# Patient Record
Sex: FEMALE | Race: ASIAN | NOT HISPANIC OR LATINO | Employment: FULL TIME | URBAN - METROPOLITAN AREA
[De-identification: names, ages, dates, MRNs, and addresses within clinical notes are randomized per-mention and may not be internally consistent; named-entity substitution may affect disease eponyms.]

---

## 2023-11-10 ENCOUNTER — OFFICE VISIT (OUTPATIENT)
Dept: FAMILY MEDICINE | Facility: CLINIC | Age: 23
End: 2023-11-10
Payer: COMMERCIAL

## 2023-11-10 VITALS
BODY MASS INDEX: 20.2 KG/M2 | HEIGHT: 61 IN | WEIGHT: 107 LBS | TEMPERATURE: 97.6 F | SYSTOLIC BLOOD PRESSURE: 114 MMHG | RESPIRATION RATE: 11 BRPM | OXYGEN SATURATION: 100 % | DIASTOLIC BLOOD PRESSURE: 64 MMHG | HEART RATE: 96 BPM

## 2023-11-10 DIAGNOSIS — Z11.59 NEED FOR HEPATITIS C SCREENING TEST: ICD-10-CM

## 2023-11-10 DIAGNOSIS — Z00.00 ROUTINE GENERAL MEDICAL EXAMINATION AT A HEALTH CARE FACILITY: Primary | ICD-10-CM

## 2023-11-10 DIAGNOSIS — E04.9 ENLARGED THYROID: ICD-10-CM

## 2023-11-10 DIAGNOSIS — M25.562 CHRONIC PAIN OF LEFT KNEE: ICD-10-CM

## 2023-11-10 DIAGNOSIS — Z83.79 FAMILY HISTORY OF LIVER DISEASE: ICD-10-CM

## 2023-11-10 DIAGNOSIS — Z11.3 SCREEN FOR STD (SEXUALLY TRANSMITTED DISEASE): ICD-10-CM

## 2023-11-10 DIAGNOSIS — Z11.4 SCREENING FOR HIV (HUMAN IMMUNODEFICIENCY VIRUS): ICD-10-CM

## 2023-11-10 DIAGNOSIS — Z13.29 SCREENING FOR THYROID DISORDER: ICD-10-CM

## 2023-11-10 DIAGNOSIS — Z76.89 ENCOUNTER TO ESTABLISH CARE: ICD-10-CM

## 2023-11-10 DIAGNOSIS — E61.1 IRON DEFICIENCY: ICD-10-CM

## 2023-11-10 DIAGNOSIS — Z13.220 SCREENING FOR HYPERLIPIDEMIA: ICD-10-CM

## 2023-11-10 DIAGNOSIS — Z13.0 SCREENING FOR DEFICIENCY ANEMIA: ICD-10-CM

## 2023-11-10 DIAGNOSIS — G89.29 CHRONIC PAIN OF LEFT KNEE: ICD-10-CM

## 2023-11-10 DIAGNOSIS — D64.9 ANEMIA, UNSPECIFIED TYPE: ICD-10-CM

## 2023-11-10 DIAGNOSIS — Z83.3 FAMILY HISTORY OF DIABETES MELLITUS: ICD-10-CM

## 2023-11-10 LAB
ALBUMIN SERPL BCG-MCNC: 4.7 G/DL (ref 3.5–5.2)
ALP SERPL-CCNC: 51 U/L (ref 35–104)
ALT SERPL W P-5'-P-CCNC: 22 U/L (ref 0–50)
ANION GAP SERPL CALCULATED.3IONS-SCNC: 12 MMOL/L (ref 7–15)
AST SERPL W P-5'-P-CCNC: 19 U/L (ref 0–45)
BILIRUB SERPL-MCNC: 0.5 MG/DL
BUN SERPL-MCNC: 9.2 MG/DL (ref 6–20)
CALCIUM SERPL-MCNC: 9.4 MG/DL (ref 8.6–10)
CHLORIDE SERPL-SCNC: 106 MMOL/L (ref 98–107)
CHOLEST SERPL-MCNC: 198 MG/DL
CREAT SERPL-MCNC: 0.53 MG/DL (ref 0.51–0.95)
DEPRECATED HCO3 PLAS-SCNC: 24 MMOL/L (ref 22–29)
EGFRCR SERPLBLD CKD-EPI 2021: >90 ML/MIN/1.73M2
ERYTHROCYTE [DISTWIDTH] IN BLOOD BY AUTOMATED COUNT: 14.9 % (ref 10–15)
GLUCOSE SERPL-MCNC: 82 MG/DL (ref 70–99)
HCT VFR BLD AUTO: 33.4 % (ref 35–47)
HDLC SERPL-MCNC: 81 MG/DL
HGB BLD-MCNC: 10.8 G/DL (ref 11.7–15.7)
LDLC SERPL CALC-MCNC: 107 MG/DL
MCH RBC QN AUTO: 19.9 PG (ref 26.5–33)
MCHC RBC AUTO-ENTMCNC: 32.3 G/DL (ref 31.5–36.5)
MCV RBC AUTO: 62 FL (ref 78–100)
NONHDLC SERPL-MCNC: 117 MG/DL
PLATELET # BLD AUTO: 253 10E3/UL (ref 150–450)
POTASSIUM SERPL-SCNC: 3.7 MMOL/L (ref 3.4–5.3)
PROT SERPL-MCNC: 7.8 G/DL (ref 6.4–8.3)
RBC # BLD AUTO: 5.43 10E6/UL (ref 3.8–5.2)
SODIUM SERPL-SCNC: 142 MMOL/L (ref 135–145)
TRIGL SERPL-MCNC: 48 MG/DL
TSH SERPL DL<=0.005 MIU/L-ACNC: 2.97 UIU/ML (ref 0.3–4.2)
WBC # BLD AUTO: 5.2 10E3/UL (ref 4–11)

## 2023-11-10 PROCEDURE — 80053 COMPREHEN METABOLIC PANEL: CPT | Performed by: NURSE PRACTITIONER

## 2023-11-10 PROCEDURE — 83540 ASSAY OF IRON: CPT | Performed by: NURSE PRACTITIONER

## 2023-11-10 PROCEDURE — 87591 N.GONORRHOEAE DNA AMP PROB: CPT | Performed by: NURSE PRACTITIONER

## 2023-11-10 PROCEDURE — 83550 IRON BINDING TEST: CPT | Performed by: NURSE PRACTITIONER

## 2023-11-10 PROCEDURE — 87389 HIV-1 AG W/HIV-1&-2 AB AG IA: CPT | Performed by: NURSE PRACTITIONER

## 2023-11-10 PROCEDURE — 86803 HEPATITIS C AB TEST: CPT | Performed by: NURSE PRACTITIONER

## 2023-11-10 PROCEDURE — 82728 ASSAY OF FERRITIN: CPT | Performed by: NURSE PRACTITIONER

## 2023-11-10 PROCEDURE — 85027 COMPLETE CBC AUTOMATED: CPT | Performed by: NURSE PRACTITIONER

## 2023-11-10 PROCEDURE — 87491 CHLMYD TRACH DNA AMP PROBE: CPT | Performed by: NURSE PRACTITIONER

## 2023-11-10 PROCEDURE — 36415 COLL VENOUS BLD VENIPUNCTURE: CPT | Performed by: NURSE PRACTITIONER

## 2023-11-10 PROCEDURE — 80061 LIPID PANEL: CPT | Performed by: NURSE PRACTITIONER

## 2023-11-10 PROCEDURE — 99385 PREV VISIT NEW AGE 18-39: CPT | Performed by: NURSE PRACTITIONER

## 2023-11-10 PROCEDURE — 84443 ASSAY THYROID STIM HORMONE: CPT | Performed by: NURSE PRACTITIONER

## 2023-11-10 ASSESSMENT — ENCOUNTER SYMPTOMS
FREQUENCY: 0
NERVOUS/ANXIOUS: 0
NAUSEA: 0
EYE PAIN: 0
HEMATURIA: 0
COUGH: 0
DIARRHEA: 0
HEADACHES: 0
HEMATOCHEZIA: 0
JOINT SWELLING: 0
DIZZINESS: 0
DYSURIA: 0
PARESTHESIAS: 0
WEAKNESS: 0
ARTHRALGIAS: 1
SHORTNESS OF BREATH: 0
PALPITATIONS: 0
CHILLS: 0
MYALGIAS: 0
ABDOMINAL PAIN: 0
SORE THROAT: 0
CONSTIPATION: 0
FEVER: 0
BREAST MASS: 0
HEARTBURN: 0

## 2023-11-10 NOTE — PROGRESS NOTES
SUBJECTIVE:   CC: Clifford is an 23 year old who presents for preventive health visit.       11/10/2023    10:58 AM   Additional Questions   Roomed by Elliot FOSTER   Accompanied by Self    - Cambodian -- Demi    Moved from [facility] 2 years ago - never seen at any other clinics.     Healthy Habits:     Getting at least 3 servings of Calcium per day:  Yes    Bi-annual eye exam:  NO    Dental care twice a year:  NO    Sleep apnea or symptoms of sleep apnea:  None    Diet:  Regular (no restrictions) and Breakfast skipped    Frequency of exercise:  None    Taking medications regularly:  Yes    Medication side effects:  None    Additional concerns today:  Yes    X2 years -- knees hurt -- can be from sitting too long.   Seems to happen more when the weather is cold outside  Burning sensation   No injuries to knees.     Sexually active. Uses Condom.   Normal menses     Heavy flow today offered pap but wants to come back next week.      Today's PHQ-2 Score:       11/10/2023    10:46 AM   PHQ-2 ( 1999 Pfizer)   Q1: Little interest or pleasure in doing things 0   Q2: Feeling down, depressed or hopeless 0   PHQ-2 Score 0   Q1: Little interest or pleasure in doing things Not at all   Q2: Feeling down, depressed or hopeless Not at all   PHQ-2 Score 0     Have you ever done Advance Care Planning? (For example, a Health Directive, POLST, or a discussion with a medical provider or your loved ones about your wishes): No, advance care planning information given to patient to review.  Patient plans to discuss their wishes with loved ones or provider.      Social History     Tobacco Use     Smoking status: Never     Smokeless tobacco: Never   Substance Use Topics     Alcohol use: Not on file         11/10/2023    10:46 AM   Alcohol Use   Prescreen: >3 drinks/day or >7 drinks/week? No     Reviewed orders with patient.  Reviewed health maintenance and updated orders accordingly - Yes  Lab work is in process    Breast Cancer  "Screening: NA      History of abnormal Pap smear: NO - age 21-29 PAP every 3 years recommended      11/17/2023     8:47 AM   PAP / HPV   PAP Negative for Intraepithelial Lesion or Malignancy (NILM)      Reviewed and updated as needed this visit by clinical staff   Tobacco  Allergies  Meds  Problems  Med Hx  Surg Hx  Fam Hx          Reviewed and updated as needed this visit by Provider   Tobacco  Allergies  Meds  Problems  Med Hx  Surg Hx  Fam Hx           Review of Systems   Constitutional:  Negative for chills and fever.   HENT:  Negative for congestion, ear pain, hearing loss and sore throat.    Eyes:  Negative for pain and visual disturbance.   Respiratory:  Negative for cough and shortness of breath.    Cardiovascular:  Negative for chest pain, palpitations and peripheral edema.   Gastrointestinal:  Negative for abdominal pain, constipation, diarrhea, heartburn, hematochezia and nausea.   Breasts:  Negative for tenderness, breast mass and discharge.   Genitourinary:  Negative for dysuria, frequency, genital sores, hematuria, pelvic pain, urgency, vaginal bleeding and vaginal discharge.   Musculoskeletal:  Positive for arthralgias. Negative for joint swelling and myalgias.   Skin:  Negative for rash.   Neurological:  Negative for dizziness, weakness, headaches and paresthesias.   Psychiatric/Behavioral:  Negative for mood changes. The patient is not nervous/anxious.        OBJECTIVE:   /64 (BP Location: Right arm, Patient Position: Chair, Cuff Size: Adult Regular)   Pulse 96   Temp 97.6  F (36.4  C) (Tympanic)   Resp 11   Ht 1.539 m (5' 0.6\")   Wt 48.5 kg (107 lb)   LMP 11/09/2023 (Exact Date)   SpO2 100%   BMI 20.49 kg/m    Physical Exam  GENERAL: healthy, alert and no distress  EYES: Eyes grossly normal to inspection, PERRL and conjunctivae and sclerae normal  HENT: ear canals and TM's normal, nose and mouth without ulcers or lesions  NECK: no adenopathy, no asymmetry, masses, or " scars and thyroid normal to palpation  RESP: lungs clear to auscultation - no rales, rhonchi or wheezes  CV: regular rate and rhythm, normal S1 S2, no S3 or S4, no murmur, click or rub, no peripheral edema and peripheral pulses strong  ABDOMEN: soft, nontender, no hepatosplenomegaly, no masses and bowel sounds normal  MS: no gross musculoskeletal defects noted, no edema  SKIN: no suspicious lesions or rashes  NEURO: Normal strength and tone, mentation intact and speech normal  PSYCH: mentation appears normal, affect normal/bright    Diagnostic Test Results:  Labs reviewed in Epic    ASSESSMENT/PLAN:   Clifford was seen today for new patient and physical.    Diagnoses and all orders for this visit:    Routine general medical examination at a health care facility  Well woman exam  completed today.    Declines vaccines.  Will come back for breast exam and Pap smear  Labs today.    Will notify of lab results.  -     REVIEW OF HEALTH MAINTENANCE PROTOCOL ORDERS    Encounter to establish care    Chronic pain of left knee  Normal exam.   Xray offered and declined.   Continue to monitor.  At home cares and ortho prn.    Enlarged thyroid  -     US Thyroid; Future    Family history of liver disease  -     Hepatitis C Screen Reflex to HCV RNA Quant and Genotype; Future  -     Comprehensive metabolic panel (BMP + Alb, Alk Phos, ALT, AST, Total. Bili, TP); Future  -     Hepatitis C Screen Reflex to HCV RNA Quant and Genotype  -     Comprehensive metabolic panel (BMP + Alb, Alk Phos, ALT, AST, Total. Bili, TP)    Screening for thyroid disorder  -     TSH with free T4 reflex; Future  -     TSH with free T4 reflex    Screening for hyperlipidemia  -     Lipid panel reflex to direct LDL Fasting; Future  -     Lipid panel reflex to direct LDL Fasting    Family history of diabetes mellitus  -     Comprehensive metabolic panel (BMP + Alb, Alk Phos, ALT, AST, Total. Bili, TP); Future  -     Comprehensive metabolic panel (BMP + Alb, Alk Phos,  ALT, AST, Total. Bili, TP)    Screening for deficiency anemia  -     CBC with platelets; Future  -     CBC with platelets    Need for hepatitis C screening test  -     Hepatitis C Screen Reflex to HCV RNA Quant and Genotype; Future  -     Hepatitis C Screen Reflex to HCV RNA Quant and Genotype    Screening for HIV (human immunodeficiency virus)  -     HIV Antigen Antibody Combo; Future  -     HIV Antigen Antibody Combo    Anemia, unspecified type  -     Ferritin; Future  -     Iron and iron binding capacity; Future  -     Ferritin  -     Iron and iron binding capacity    Iron deficiency  -     CBC with platelets; Future  -     Ferritin; Future  -     Iron and iron binding capacity; Future    Screen for STD (sexually transmitted disease)  Other orders  -     NEISSERIA GONORRHOEA PCR; Future  -     CHLAMYDIA TRACHOMATIS PCR; Future  -     NEISSERIA GONORRHOEA PCR  -     CHLAMYDIA TRACHOMATIS PCR        Patient has been advised of split billing requirements and indicates understanding: Yes      COUNSELING:  Reviewed preventive health counseling, as reflected in patient instructions        She reports that she has never smoked. She has never used smokeless tobacco.                CHILANGO Aguila [facility] [place]

## 2023-11-10 NOTE — COMMUNITY RESOURCES LIST (ENGLISH)
11/10/2023   [facility] - Outpatient Clinics  N/A  For additional resource needs, please contact your health insurance member services or your primary care team.  Phone: [phone]   Email: N/A   Address: [facility] [place] [address]   Hours: N/A        Hotlines and Helplines       Hotline - Housing crisis  1  [facility] (Main Office) - Emergency Services Distance: 8.34 miles      Phone/Virtual   [address] [place] [address]  Language: English  Hours: Mon - Sun Open 24 Hours   Phone: (463) 904-2662 Email: info@Syniverse.TIMPIK Website: http://Syniverse.TIMPIK     2  [facility] Distance: 14.13 miles      Phone/Virtual   [address] [place] [address]  Language: English  Hours: Mon - Sun Open 24 Hours   Phone: (266) 613-9367 Email: info@Syniverse.TIMPIK Website: http://www.Syniverse.TIMPIK          Housing       Coordinated Entry access point  3  [facility] ([facility] - Housing Services Distance: 14.52 miles      In-Person   [address] [place] [address]  Language: English  Hours: Mon - Fri 9:00 AM - 5:00 PM  Fees: Free   Phone: (948) 508-3085 Email: housing@[facility].org Website: http://www.[facility].org/housing     4  [facility] Distance: 18.02 miles      In-Person, Phone/Virtual   [address] [place] [address]  Language: English, Lao  Hours: Mon - Fri 8:30 AM - 4:30 PM  Fees: Free   Phone: (519) 347-5909 Email: info@mncare.org Website: https://www.[facility].org/locations/downton-clinic/     Drop-in center or day shelter  5  [facility] Drop-In Resource Center Distance: 12.2 miles      In-Person   [address] [place] [address]  Language: English, Lao  Hours: Tue 2:00 PM - 5:00 PM , Thu 2:00 PM - 5:00 PM  Fees: Free   Phone: (837) 272-6735 Email: admin@launchministry.org Website: https://www.FunnelFire.org/     6  [employer] [place] - Drop-in  [place] Distance: 12.48 miles      In-Person   [address] [place] [address]  Language: English  Hours: Mon - Thu 2:30 PM - 5:00 PM  Fees: Free   Phone: (218) 599-5832 Email: info@moveLightning Gaming.org Website: http://[employer].org/     Housing search assistance  7  [employer] - Online housing search assistance Distance: 15.67 miles      Phone/Virtual   [address] [place] [address]  Language: English, Hmong, French, Citizen of the [place]  Hours: Mon - Sun Open 24 Hours   Phone: (429) 280-2096 Email: info@housinglink.org Website: http://www.Zooppa.org/     8  Affordable Housing Online - https://e-volo/ Distance: 15.76 miles      Phone/Virtual   [address] [place] [address]  Language: English  Hours: Mon - Sun Open 24 Hours   Email: info@LocaModa Website: https://e-volo     Shelter for individuals  9  [facility] ([facility]) [facility] Distance: 8.43 miles      In-Person   [address] [place] [address]  Language: English, Citizen of the [place]  Hours: Mon - Fri 8:00 AM - 4:30 PM  Fees: Free   Phone: (528) 174-8532 Email: info@[facility].org Website: https://www.capagency.org/     10  [facility] - Emergency Shelter Program Distance: 12.2 miles      In-Person   [address] [place] [address]  Language: English, Citizen of the [place]  Hours: Tue 2:00 PM - 5:00 PM , Thu 2:00 PM - 5:00 PM  Fees: Free   Phone: (336) 213-9775 Email: admin@OurCrowdministry.org Website: https://www.OurCrowdministry.org/     Shelter for youth  11  [facility] - Emergency Youth Shelter Distance: 13.39 miles      In-Person   [address] [place] [address]  Language: English  Hours: Mon - Sun Open 24 Hours  Fees: Free   Phone: (469) 220-1717 Email: info@cctwincities.org Website: https://www.cctwincities.org/locations/hope-street/          Important Numbers & Websites       [place] [address]  211unitedway.org  Poison Control   (791) 778-9335 [place]po[place]  Suicide and Crisis Lifeline   98 988[facility] Hospitalsline.org  Childhelp [place] Child Abuse Hotline   739.819.1534 Childhelphotline.org  [place] Sexual Assault Hotline   (392) 900-2912 (HOPE) [facility] Runaway Safeline   (555) 643-9679 (RUNAWAY) [facility].org  Pregnancy & Postpartum Support [place]   Call/text 621-132-1230 Ppsupportmn.org  Substance Abuse National Helpline ([facility]   597-232-HELP (5504) Findtreatment.gov  Emergency Services   919

## 2023-11-10 NOTE — COMMUNITY RESOURCES LIST (ENGLISH)
11/10/2023   [facility]  N/A  For questions about this resource list or additional care needs, please contact your primary care clinic or care manager.  Phone: [phone]   Email: N/A   Address: [facility] [place] [address]   Hours: N/A        Hotlines and Helplines       Hotline - Housing crisis  1  [facility] (Main Office) - Emergency Services Distance: 8.34 miles      Phone/Virtual   [address] [place] [address]  Language: English  Hours: Mon - Sun Open 24 Hours   Phone: (680) 869-9077 Email: info@Odyssey Thera.Layar Website: http://Odyssey Thera.Layar     2  [facility] Distance: 14.13 miles      Phone/Virtual   [address] [place] [address]  Language: English  Hours: Mon - Sun Open 24 Hours   Phone: (207) 649-8755 Email: info@Odyssey Thera.Layar Website: http://www.Odyssey Thera.org          Housing       Coordinated Entry access point  3  [facility] ([facility] - Housing Services Distance: 14.52 miles      In-Person   [address] [place] [address]  Language: English  Hours: Mon - Fri 9:00 AM - 5:00 PM  Fees: Free   Phone: (300) 242-7527 Email: housing@[facility].org Website: http://www.[facility].org/housing     4  [facility] [place] [facility] Distance: 18.02 miles      In-Person, Phone/Virtual   [address] [place] [address]  Language: English, Greenlandic  Hours: Mon - Fri 8:30 AM - 4:30 PM  Fees: Free   Phone: (590) 970-4767 Email: info@mncare.org Website: https://www.[facility].org/locations/downto-clinic/     Drop-in center or day shelter  5  [facility] Drop-In Resource Center Distance: 12.2 miles      In-Person   [address] [place] [address]  Language: English, Greenlandic  Hours: Tue 2:00 PM - 5:00 PM , Thu 2:00 PM - 5:00 PM  Fees: Free   Phone: (498) 865-3422 Email: admin@launchministry.org Website: https://www.Needl.org/     6  [facility] [place] - Drop-in  [place] Distance: 12.48 miles      In-Person   [address] [place] [address]  Language: English  Hours: Mon - Thu 2:30 PM - 5:00 PM  Fees: Free   Phone: (699) 471-5996 Email: info@[employer].Supersonic Website: http://[employer]aceuticals.Supersonic/     Housing search assistance  7  [facility] - Rental Homes for Future Homebuyers Program Distance: 5.31 miles      Phone/Virtual   [address] [place] [address]  Language: English  Hours: Mon - Fri 8:00 AM - 4:30 PM  Fees: Free   Phone: (599) 631-6743 Email: hra@[facility] Website: https://www.[facility]/hra/[place]-housing-and-ehsjcqomcvvbx-xjijjuhjb-wnx     8  [employer] - Housing Case Management Distance: 12.2 miles      In-Person, Phone/Virtual   [address] [place] [address]  Language: English, Latvian  Hours: Tue 2:00 PM - 5:00 PM , Thu 2:00 PM - 5:00 PM  Fees: Free   Phone: (845) 665-9821 Email: admin@[employer].Supersonic Website: https://www.Broadcast Pix.org/     Shelter for individuals  9  Community [employer] Partnership ([facility]) [facility] Distance: 8.43 miles      In-Person   [address] [place] [address]  Language: English, Latvian  Hours: Mon - Fri 8:00 AM - 4:30 PM  Fees: Free   Phone: (561) 670-9295 Email: info@[facility].org Website: https://www.On Demand TherapeuticsagenStepOne Health.org/     10  [employer] - Emergency Shelter Program Distance: 12.2 miles      In-Person   [address] [place] [address]  Language: English, Latvian  Hours: Tue 2:00 PM - 5:00 PM , Thu 2:00 PM - 5:00 PM  Fees: Free   Phone: (203) 847-4404 Email: admin@[employer].Supersonic Website: https://www.Broadcast Pix.org/     Shelter for youth  11  [facility] - Emergency Youth Shelter Distance: 13.39 miles      In-Person   [address] [place] [address]  Language: English  Hours: Mon - Sun Open 24 Hours  Fees: Free   Phone: (978) 771-4746 Email:  info@ActionBase.org Website: https://www.ActionBase.org/locations/hope-street/          Important Numbers & Websites       Emergency Services   911  [facility] Services   311  Poison Control   (771) 194-3349  Suicide Prevention Lifeline   (400) 979-4796 (TALK)  Child Abuse Hotline   (776) 245-7937 (4-A-Child)  Sexual Assault Hotline   (677) 227-8661 (HOPE)  National Runaway Safeline   (295) 613-2437 (RUNAWAY)  All-Options Talkline   (206) 505-6400  Substance Abuse Referral   (913) 662-2496 (HELP)

## 2023-11-11 LAB
C TRACH DNA SPEC QL NAA+PROBE: NEGATIVE
HCV AB SERPL QL IA: NONREACTIVE
HIV 1+2 AB+HIV1 P24 AG SERPL QL IA: NONREACTIVE
N GONORRHOEA DNA SPEC QL NAA+PROBE: NEGATIVE

## 2023-11-16 LAB
FERRITIN SERPL-MCNC: 83 NG/ML (ref 6–175)
IRON BINDING CAPACITY (ROCHE): 247 UG/DL (ref 240–430)
IRON SATN MFR SERPL: 28 % (ref 15–46)
IRON SERPL-MCNC: 70 UG/DL (ref 37–145)

## 2023-11-17 ENCOUNTER — OFFICE VISIT (OUTPATIENT)
Dept: FAMILY MEDICINE | Facility: CLINIC | Age: 23
End: 2023-11-17
Payer: COMMERCIAL

## 2023-11-17 VITALS
SYSTOLIC BLOOD PRESSURE: 97 MMHG | WEIGHT: 108 LBS | HEIGHT: 61 IN | TEMPERATURE: 97 F | RESPIRATION RATE: 12 BRPM | BODY MASS INDEX: 20.39 KG/M2 | OXYGEN SATURATION: 98 % | HEART RATE: 87 BPM | DIASTOLIC BLOOD PRESSURE: 60 MMHG

## 2023-11-17 DIAGNOSIS — Z12.4 CERVICAL CANCER SCREENING: Primary | ICD-10-CM

## 2023-11-17 DIAGNOSIS — Z23 NEED FOR TDAP VACCINATION: ICD-10-CM

## 2023-11-17 PROCEDURE — 99207 PR NO CHARGE LOS: CPT | Performed by: NURSE PRACTITIONER

## 2023-11-17 PROCEDURE — 90715 TDAP VACCINE 7 YRS/> IM: CPT | Performed by: NURSE PRACTITIONER

## 2023-11-17 PROCEDURE — G0145 SCR C/V CYTO,THINLAYER,RESCR: HCPCS | Performed by: NURSE PRACTITIONER

## 2023-11-17 PROCEDURE — 90471 IMMUNIZATION ADMIN: CPT | Performed by: NURSE PRACTITIONER

## 2023-11-17 NOTE — PROGRESS NOTES
Assessment & Plan     Cervical cancer screening    - Pap Screen only - recommended age 21 - 24 years    Need for Tdap vaccination    - TDAP 10-64Y (ADACEL,BOOSTRIX)    No follow-ups on file.    CHILANGO Aguila [facility] [place] Horton is a 23 year old, presenting for the following health issues:  Gyn Exam        11/17/2023     8:19 AM   Additional Questions   Roomed by Csaba CMA   Accompanied by Self       HPI     Pap only today - had PX 11/10/2023   used.     Review of Systems   Constitutional, HEENT, cardiovascular, pulmonary, GI, , musculoskeletal, neuro, skin, endocrine and psych systems are negative, except as otherwise noted in the HPI.      Objective    LMP 11/09/2023 (Exact Date)   There is no height or weight on file to calculate BMI.  Physical Exam   GENERAL: healthy, alert and no distress  BREAST: normal without masses, tenderness or nipple discharge and no palpable axillary masses or adenopathy   (female): normal female external genitalia, normal urethral meatus, vaginal mucosa, normal cervix/adnexa/uterus without masses or discharge

## 2023-11-21 LAB
BKR LAB AP GYN ADEQUACY: NORMAL
BKR LAB AP GYN INTERPRETATION: NORMAL
BKR LAB AP HPV REFLEX: NO
BKR LAB AP PREVIOUS ABNORMAL: NORMAL
PATH REPORT.COMMENTS IMP SPEC: NORMAL
PATH REPORT.COMMENTS IMP SPEC: NORMAL
PATH REPORT.RELEVANT HX SPEC: NORMAL

## 2023-11-22 NOTE — RESULT ENCOUNTER NOTE
Please call with  Clifford,    Here is a summary of your recent test results:    Low Hgb and smaller size to cells most consistent  with iron deficiency. ADVISE: increasing iron in your diet and consider taking iron supplement for 2 months (ferrous gluconate 325 mg twice daily - to avoid constipation from the supplement you should increase fluid intake and fiber in your diet)  Also, recheck your labs in 3 months.    All other labs are normal.       Healthy regards,    Rivka Bradford, [employer]

## 2023-11-24 ENCOUNTER — HOSPITAL ENCOUNTER (OUTPATIENT)
Dept: ULTRASOUND IMAGING | Facility: CLINIC | Age: 23
Discharge: HOME | End: 2023-11-24
Attending: NURSE PRACTITIONER | Admitting: NURSE PRACTITIONER
Payer: COMMERCIAL

## 2023-11-24 DIAGNOSIS — E04.9 ENLARGED THYROID: ICD-10-CM

## 2023-11-24 PROCEDURE — 76536 US EXAM OF HEAD AND NECK: CPT

## 2023-11-27 NOTE — RESULT ENCOUNTER NOTE
Please call  Clifford with an ,    Here is a summary of your recent test results:    Her thyroid ultrasound is normal.       NING Aguila  [facility]

## 2024-06-04 ENCOUNTER — OFFICE VISIT (OUTPATIENT)
Dept: URGENT CARE | Facility: URGENT CARE | Age: 24
End: 2024-06-04
Payer: COMMERCIAL

## 2024-06-04 VITALS
TEMPERATURE: 97.1 F | BODY MASS INDEX: 21.44 KG/M2 | WEIGHT: 112 LBS | OXYGEN SATURATION: 100 % | SYSTOLIC BLOOD PRESSURE: 115 MMHG | DIASTOLIC BLOOD PRESSURE: 81 MMHG | HEART RATE: 83 BPM

## 2024-06-04 DIAGNOSIS — N39.0 ACUTE UTI: Primary | ICD-10-CM

## 2024-06-04 DIAGNOSIS — R30.0 DYSURIA: ICD-10-CM

## 2024-06-04 DIAGNOSIS — B37.31 YEAST INFECTION OF THE VAGINA: ICD-10-CM

## 2024-06-04 LAB
ALBUMIN UR-MCNC: NEGATIVE MG/DL
APPEARANCE UR: ABNORMAL
BACTERIA #/AREA URNS HPF: ABNORMAL /HPF
BILIRUB UR QL STRIP: NEGATIVE
C TRACH DNA SPEC QL PROBE+SIG AMP: NEGATIVE
CLUE CELLS: ABNORMAL
COLOR UR AUTO: YELLOW
GLUCOSE UR STRIP-MCNC: NEGATIVE MG/DL
HGB UR QL STRIP: ABNORMAL
KETONES UR STRIP-MCNC: NEGATIVE MG/DL
LEUKOCYTE ESTERASE UR QL STRIP: ABNORMAL
N GONORRHOEA DNA SPEC QL NAA+PROBE: NEGATIVE
NITRATE UR QL: NEGATIVE
PH UR STRIP: 6 [PH] (ref 5–7)
RBC #/AREA URNS AUTO: ABNORMAL /HPF
SP GR UR STRIP: 1.01 (ref 1–1.03)
SQUAMOUS #/AREA URNS AUTO: ABNORMAL /LPF
TRICHOMONAS, WET PREP: ABNORMAL
UROBILINOGEN UR STRIP-ACNC: 0.2 E.U./DL
WBC #/AREA URNS AUTO: >100 /HPF
WBC CLUMPS #/AREA URNS HPF: PRESENT /HPF
WBC'S/HIGH POWER FIELD, WET PREP: ABNORMAL
YEAST, WET PREP: PRESENT

## 2024-06-04 PROCEDURE — 87186 SC STD MICRODIL/AGAR DIL: CPT | Performed by: PHYSICIAN ASSISTANT

## 2024-06-04 PROCEDURE — 87491 CHLMYD TRACH DNA AMP PROBE: CPT | Performed by: PHYSICIAN ASSISTANT

## 2024-06-04 PROCEDURE — 87086 URINE CULTURE/COLONY COUNT: CPT | Performed by: PHYSICIAN ASSISTANT

## 2024-06-04 PROCEDURE — 99214 OFFICE O/P EST MOD 30 MIN: CPT | Performed by: PHYSICIAN ASSISTANT

## 2024-06-04 PROCEDURE — 81001 URINALYSIS AUTO W/SCOPE: CPT | Performed by: PHYSICIAN ASSISTANT

## 2024-06-04 PROCEDURE — 87591 N.GONORRHOEAE DNA AMP PROB: CPT | Performed by: PHYSICIAN ASSISTANT

## 2024-06-04 PROCEDURE — 87210 SMEAR WET MOUNT SALINE/INK: CPT | Performed by: PHYSICIAN ASSISTANT

## 2024-06-04 RX ORDER — FLUCONAZOLE 150 MG/1
TABLET ORAL
Qty: 2 TABLET | Refills: 0 | Status: SHIPPED | OUTPATIENT
Start: 2024-06-04

## 2024-06-04 RX ORDER — NITROFURANTOIN 25; 75 MG/1; MG/1
100 CAPSULE ORAL 2 TIMES DAILY
Qty: 10 CAPSULE | Refills: 0 | Status: SHIPPED | OUTPATIENT
Start: 2024-06-04 | End: 2024-06-09

## 2024-06-04 NOTE — PROGRESS NOTES
Patient presents with:  UTI: Difficult to urinate and then after still feels like she need to go. 1 week    (N39.0) Acute UTI  (primary encounter diagnosis)  Comment:   Plan: nitroFURantoin macrocrystal-monohydrate         (MACROBID) 100 MG capsule            (R30.0) Dysuria  Comment:   Plan: UA Macroscopic with reflex to Microscopic and         Culture - Clinic Collect, Wet prep - Clinic         Collect, Urine Microscopic Exam, Urine Culture,        Chlamydia trachomatis/Neisseria gonorrhoeae by         PCR - Clinic Collect, CANCELED: Chlamydia         trachomatis/Neisseria gonorrhoeae by PCR -         Clinic Collect            (B37.31) Yeast infection of the vagina  Comment:   Plan: fluconazole (DIFLUCAN) 150 MG tablet        Take 1 tablet today and then take the second tablet after finishing the antibiotic.      We will contact you if the Gonorrhea or Chlamydia test is positive and treat appropriately.        See orders in Epic    At the end of the encounter, I discussed results, diagnosis, medications. Discussed red flags for immediate return to clinic/ER, as well as indications for follow up if no improvement. Patient understood and agreed to plan. Patient was stable for discharge     If not improving or if condition worsens, follow up with your Primary Care Provider      31 minutes spent by me on the date of the encounter doing chart review, review of test results, interpretation of tests, patient visit, documentation, and communication via Cambodian telephone         SUBJECTIVE:   Clifford Murray is a 24 year old female who presents today with difficulty with urination for the past week.  Urinates then feels like she still needs to urinate.  Denies any dysuria.  Denies any fevers.  She has been sexually active.  Denies any vaginal discharge.      No past medical history on file.      Current Outpatient Medications   Medication Sig Dispense Refill    Multiple Vitamins-Iron (DAILY-NICHELLE/IRON/BETA-CAROTENE)  TABS TAKE 1 TABLET BY MOUTH DAILY. (Patient not taking: Reported on 10/19/2020) 30 tablet 7     Social History     Tobacco Use    Smoking status: Never Smoker    Smokeless tobacco: Never Used   Substance Use Topics    Alcohol use: Not on file     Family History   Problem Relation Age of Onset    Diabetes Mother     Diabetes Father          ROS:    10 point ROS of systems including Constitutional, Eyes, Respiratory, Cardiovascular, Gastroenterology, Genitourinary, Integumentary, Muscularskeletal, Psychiatric ,neurological were all negative except for pertinent positives noted in my HPI       OBJECTIVE:  /81   Pulse 83   Temp 97.1  F (36.2  C) (Tympanic)   Wt 50.8 kg (112 lb)   SpO2 100%   BMI 21.44 kg/m    Physical Exam:  GENERAL APPEARANCE: healthy, alert and no distress  RESP: lungs clear to auscultation - no rales, rhonchi or wheezes  CV: regular rates and rhythm, normal S1 S2, no murmur noted  ABDOMEN:  soft, nontender, no HSM or masses and bowel sounds normal  GU_female: deferred  SKIN: no suspicious lesions or rashes    Results for orders placed or performed in visit on 06/04/24   UA Macroscopic with reflex to Microscopic and Culture - Clinic Collect     Status: Abnormal    Specimen: Urine, Clean Catch   Result Value Ref Range    Color Urine Yellow Colorless, Straw, Light Yellow, Yellow    Appearance Urine Slightly Cloudy (A) Clear    Glucose Urine Negative Negative mg/dL    Bilirubin Urine Negative Negative    Ketones Urine Negative Negative mg/dL    Specific Gravity Urine 1.015 1.003 - 1.035    Blood Urine Small (A) Negative    pH Urine 6.0 5.0 - 7.0    Protein Albumin Urine Negative Negative mg/dL    Urobilinogen Urine 0.2 0.2, 1.0 E.U./dL    Nitrite Urine Negative Negative    Leukocyte Esterase Urine Large (A) Negative   Urine Microscopic Exam     Status: Abnormal   Result Value Ref Range    Bacteria Urine Many (A) None Seen /HPF    RBC Urine 10-25 (A) 0-2 /HPF /HPF    WBC Urine >100 (A) 0-5 /HPF  /HPF    Squamous Epithelials Urine Moderate (A) None Seen /LPF    WBC Clumps Urine Present (A) None Seen /HPF   Wet prep - Clinic Collect     Status: Abnormal    Specimen: Vagina; Swab   Result Value Ref Range    Trichomonas Absent Absent    Yeast Present (A) Absent    Clue Cells Absent Absent    WBCs/high power field 2+ (A) None

## 2024-06-04 NOTE — PATIENT INSTRUCTIONS
(N39.0) Acute UTI  (primary encounter diagnosis)  Comment:   Plan: nitroFURantoin macrocrystal-monohydrate         (MACROBID) 100 MG capsule            (R30.0) Dysuria  Comment:   Plan: UA Macroscopic with reflex to Microscopic and         Culture - Clinic Collect, Wet prep - Clinic         Collect, Urine Microscopic Exam, Urine Culture,        Chlamydia trachomatis/Neisseria gonorrhoeae by         PCR - Clinic Collect, CANCELED: Chlamydia         trachomatis/Neisseria gonorrhoeae by PCR -         Clinic Collect            (B37.31) Yeast infection of the vagina  Comment:   Plan: fluconazole (DIFLUCAN) 150 MG tablet        Take 1 tablet today and then take the second tablet after finishing the antibiotic.      We will contact you if the Gonorrhea or Chlamydia test is positive and treat appropriately.

## 2024-06-06 LAB — BACTERIA UR CULT: ABNORMAL

## 2024-12-22 SDOH — HEALTH STABILITY: PHYSICAL HEALTH: ON AVERAGE, HOW MANY MINUTES DO YOU ENGAGE IN EXERCISE AT THIS LEVEL?: 0 MIN

## 2024-12-22 SDOH — HEALTH STABILITY: PHYSICAL HEALTH: ON AVERAGE, HOW MANY DAYS PER WEEK DO YOU ENGAGE IN MODERATE TO STRENUOUS EXERCISE (LIKE A BRISK WALK)?: 0 DAYS

## 2024-12-22 ASSESSMENT — SOCIAL DETERMINANTS OF HEALTH (SDOH): HOW OFTEN DO YOU GET TOGETHER WITH FRIENDS OR RELATIVES?: TWICE A WEEK

## 2024-12-23 ENCOUNTER — OFFICE VISIT (OUTPATIENT)
Dept: FAMILY MEDICINE | Facility: CLINIC | Age: 24
End: 2024-12-23
Payer: COMMERCIAL

## 2024-12-23 VITALS
HEART RATE: 110 BPM | DIASTOLIC BLOOD PRESSURE: 62 MMHG | RESPIRATION RATE: 13 BRPM | HEIGHT: 61 IN | SYSTOLIC BLOOD PRESSURE: 104 MMHG | TEMPERATURE: 97.2 F | OXYGEN SATURATION: 100 % | BODY MASS INDEX: 20.58 KG/M2 | WEIGHT: 109 LBS

## 2024-12-23 DIAGNOSIS — Z13.0 SCREENING FOR DEFICIENCY ANEMIA: ICD-10-CM

## 2024-12-23 DIAGNOSIS — Z13.1 SCREENING FOR DIABETES MELLITUS: ICD-10-CM

## 2024-12-23 DIAGNOSIS — Z79.899 MEDICATION MANAGEMENT: ICD-10-CM

## 2024-12-23 DIAGNOSIS — R20.9 COLD HANDS AND FEET: ICD-10-CM

## 2024-12-23 DIAGNOSIS — K59.00 CONSTIPATION, UNSPECIFIED CONSTIPATION TYPE: ICD-10-CM

## 2024-12-23 DIAGNOSIS — Z11.3 SCREEN FOR STD (SEXUALLY TRANSMITTED DISEASE): ICD-10-CM

## 2024-12-23 DIAGNOSIS — R68.83 CHILLS: ICD-10-CM

## 2024-12-23 DIAGNOSIS — Z00.00 ROUTINE GENERAL MEDICAL EXAMINATION AT A HEALTH CARE FACILITY: Primary | ICD-10-CM

## 2024-12-23 DIAGNOSIS — R19.5 DARK STOOLS: ICD-10-CM

## 2024-12-23 DIAGNOSIS — Z13.29 SCREENING FOR THYROID DISORDER: ICD-10-CM

## 2024-12-23 LAB
ALBUMIN SERPL BCG-MCNC: 4.4 G/DL (ref 3.5–5.2)
ALP SERPL-CCNC: 47 U/L (ref 40–150)
ALT SERPL W P-5'-P-CCNC: 21 U/L (ref 0–50)
ANION GAP SERPL CALCULATED.3IONS-SCNC: 12 MMOL/L (ref 7–15)
AST SERPL W P-5'-P-CCNC: 22 U/L (ref 0–45)
BILIRUB SERPL-MCNC: 0.4 MG/DL
BUN SERPL-MCNC: 11.5 MG/DL (ref 6–20)
CALCIUM SERPL-MCNC: 9.4 MG/DL (ref 8.8–10.4)
CHLORIDE SERPL-SCNC: 105 MMOL/L (ref 98–107)
CREAT SERPL-MCNC: 0.61 MG/DL (ref 0.51–0.95)
EGFRCR SERPLBLD CKD-EPI 2021: >90 ML/MIN/1.73M2
ERYTHROCYTE [DISTWIDTH] IN BLOOD BY AUTOMATED COUNT: 16.1 % (ref 10–15)
EST. AVERAGE GLUCOSE BLD GHB EST-MCNC: 100 MG/DL
FERRITIN SERPL-MCNC: 85 NG/ML (ref 6–175)
GLUCOSE SERPL-MCNC: 88 MG/DL (ref 70–99)
HBA1C MFR BLD: 5.1 % (ref 0–5.6)
HCO3 SERPL-SCNC: 23 MMOL/L (ref 22–29)
HCT VFR BLD AUTO: 38.3 % (ref 35–47)
HGB BLD-MCNC: 11.8 G/DL (ref 11.7–15.7)
IRON BINDING CAPACITY (ROCHE): 279 UG/DL (ref 240–430)
IRON SATN MFR SERPL: 26 % (ref 15–46)
IRON SERPL-MCNC: 72 UG/DL (ref 37–145)
MCH RBC QN AUTO: 19.9 PG (ref 26.5–33)
MCHC RBC AUTO-ENTMCNC: 30.8 G/DL (ref 31.5–36.5)
MCV RBC AUTO: 65 FL (ref 78–100)
PLATELET # BLD AUTO: 260 10E3/UL (ref 150–450)
POTASSIUM SERPL-SCNC: 4.1 MMOL/L (ref 3.4–5.3)
PROT SERPL-MCNC: 7.5 G/DL (ref 6.4–8.3)
RBC # BLD AUTO: 5.94 10E6/UL (ref 3.8–5.2)
SODIUM SERPL-SCNC: 140 MMOL/L (ref 135–145)
T4 FREE SERPL-MCNC: 1.13 NG/DL (ref 0.9–1.7)
TSH SERPL DL<=0.005 MIU/L-ACNC: 2 UIU/ML (ref 0.3–4.2)
WBC # BLD AUTO: 5.7 10E3/UL (ref 4–11)

## 2024-12-23 PROCEDURE — 99395 PREV VISIT EST AGE 18-39: CPT | Performed by: NURSE PRACTITIONER

## 2024-12-23 PROCEDURE — 83550 IRON BINDING TEST: CPT | Performed by: NURSE PRACTITIONER

## 2024-12-23 PROCEDURE — 86376 MICROSOMAL ANTIBODY EACH: CPT | Performed by: NURSE PRACTITIONER

## 2024-12-23 PROCEDURE — 83540 ASSAY OF IRON: CPT | Performed by: NURSE PRACTITIONER

## 2024-12-23 PROCEDURE — 83036 HEMOGLOBIN GLYCOSYLATED A1C: CPT | Performed by: NURSE PRACTITIONER

## 2024-12-23 PROCEDURE — 99214 OFFICE O/P EST MOD 30 MIN: CPT | Mod: 25 | Performed by: NURSE PRACTITIONER

## 2024-12-23 PROCEDURE — 36415 COLL VENOUS BLD VENIPUNCTURE: CPT | Performed by: NURSE PRACTITIONER

## 2024-12-23 PROCEDURE — 82728 ASSAY OF FERRITIN: CPT | Performed by: NURSE PRACTITIONER

## 2024-12-23 PROCEDURE — 84443 ASSAY THYROID STIM HORMONE: CPT | Performed by: NURSE PRACTITIONER

## 2024-12-23 PROCEDURE — 85027 COMPLETE CBC AUTOMATED: CPT | Performed by: NURSE PRACTITIONER

## 2024-12-23 PROCEDURE — 80053 COMPREHEN METABOLIC PANEL: CPT | Performed by: NURSE PRACTITIONER

## 2024-12-23 PROCEDURE — 84439 ASSAY OF FREE THYROXINE: CPT | Performed by: NURSE PRACTITIONER

## 2024-12-23 NOTE — PATIENT INSTRUCTIONS
Patient Education   Preventive Care Advice   This is general advice given by our system to help you stay healthy. However, your care team may have specific advice just for you. Please talk to your care team about your preventive care needs.  Nutrition  Eat 5 or more servings of fruits and vegetables each day.  Try wheat bread, brown rice and whole grain pasta (instead of white bread, rice, and pasta).  Get enough calcium and vitamin D. Check the label on foods and aim for 100% of the RDA (recommended daily allowance).  Lifestyle  Exercise at least 150 minutes each week  (30 minutes a day, 5 days a week).  Do muscle strengthening activities 2 days a week. These help control your weight and prevent disease.  No smoking.  Wear sunscreen to prevent skin cancer.  Have a dental exam and cleaning every 6 months.  Yearly exams  See your health care team every year to talk about:  Any changes in your health.  Any medicines your care team has prescribed.  Preventive care, family planning, and ways to prevent chronic diseases.  Shots (vaccines)   HPV shots (up to age 26), if you've never had them before.  Hepatitis B shots (up to age 59), if you've never had them before.  COVID-19 shot: Get this shot when it's due.  Flu shot: Get a flu shot every year.  Tetanus shot: Get a tetanus shot every 10 years.  Pneumococcal, hepatitis A, and RSV shots: Ask your care team if you need these based on your risk.  Shingles shot (for age 50 and up)  General health tests  Diabetes screening:  Starting at age 35, Get screened for diabetes at least every 3 years.  If you are younger than age 35, ask your care team if you should be screened for diabetes.  Cholesterol test: At age 39, start having a cholesterol test every 5 years, or more often if advised.  Bone density scan (DEXA): At age 50, ask your care team if you should have this scan for osteoporosis (brittle bones).  Hepatitis C: Get tested at least once in your life.  STIs (sexually  transmitted infections)  Before age 24: Ask your care team if you should be screened for STIs.  After age 24: Get screened for STIs if you're at risk. You are at risk for STIs (including HIV) if:  You are sexually active with more than one person.  You don't use condoms every time.  You or a partner was diagnosed with a sexually transmitted infection.  If you are at risk for HIV, ask about PrEP medicine to prevent HIV.  Get tested for HIV at least once in your life, whether you are at risk for HIV or not.  Cancer screening tests  Cervical cancer screening: If you have a cervix, begin getting regular cervical cancer screening tests starting at age 21.  Breast cancer scan (mammogram): If you've ever had breasts, begin having regular mammograms starting at age 40. This is a scan to check for breast cancer.  Colon cancer screening: It is important to start screening for colon cancer at age 45.  Have a colonoscopy test every 10 years (or more often if you're at risk) Or, ask your provider about stool tests like a FIT test every year or Cologuard test every 3 years.  To learn more about your testing options, visit:   .  For help making a decision, visit:   https://bit.ly/rj20938.  Prostate cancer screening test: If you have a prostate, ask your care team if a prostate cancer screening test (PSA) at age 55 is right for you.  Lung cancer screening: If you are a current or former smoker ages 50 to 80, ask your care team if ongoing lung cancer screenings are right for you.  For informational purposes only. Not to replace the advice of your health care provider. Copyright   2023 [employer]. All rights reserved. Clinically reviewed by the [facility] Transitions Program. 1-4 All 802307 - REV 01/24.

## 2024-12-23 NOTE — PROGRESS NOTES
Preventive Care Visit  [facility] [place]  CHILANGO Aguila CNP, Nurse Practitioner - Family  Dec 23, 2024    Assessment & Plan     Routine general medical examination at a health care facility  Well woman exam completed today.    Labs today.    Will notify of lab results.   - REVIEW OF HEALTH MAINTENANCE PROTOCOL ORDERS    Chills  Nonspecific but discussed with  this sounds like she runs a little cold cool night sweats or concerns with weight.  Will check labs.    Cold hands and feet  This could be some Raynaud's this was explained.  She has a completely normal exam recommend keeping hands warm with mittens etc. try to avoid extreme cold like outdoors will check labs in particular thyroid to see if any concern there that could explain some of this reassurance provided with   - Thyroid peroxidase antibody  - T4, free  - T4, free  - TSH  - Thyroid peroxidase antibody  - T4, free  - TSH    Dark stools  Constipation, unspecified constipation type    - Adult GI  Referral - Consult Only    Screen for STD (sexually transmitted disease)    - Chlamydia trachomatis/Neisseria gonorrhoeae by PCR- VAGINAL SELF-SWAB    Screening for deficiency anemia    - CBC with platelets  - Ferritin  - Iron and iron binding capacity  - CBC with platelets  - Ferritin  - Iron and iron binding capacity    Medication management    - Comprehensive metabolic panel  - Comprehensive metabolic panel    Screening for diabetes mellitus    - Comprehensive metabolic panel  - Hemoglobin A1c  - Comprehensive metabolic panel  - Hemoglobin A1c    Screening for thyroid disorder    - Thyroid peroxidase antibody  - T4, free  - T4, free  - TSH  - Thyroid peroxidase antibody  - T4, free  - TSH      Patient has been advised of split billing requirements and indicates understanding: Yes        Counseling  Appropriate preventive services were addressed with this patient via screening, questionnaire, or discussion as  appropriate for fall prevention, nutrition, physical activity, Tobacco-use cessation, social engagement, weight loss and cognition.  Checklist reviewing preventive services available has been given to the patient.  Reviewed patient's diet, addressing concerns and/or questions.       Return in about 10 days (around 1/2/2025) for Lab only appointment for vag swab when not on menses-she wll do bloodwork today.     Shakila Horton is a 24 year old, presenting for the following:  Physical        12/23/2024     7:21 AM   Additional Questions   Roomed by Elliot FOSTER   Accompanied by  - ID [id number] - over the phone          HPI     used for visit     Requesting A1c to drawn today    X3-4 months - Constipation - has BM every X4-5D.    No pain. Does have distention in abdomen prior to having a BM.   Color is sometimes black or yellow, able to go only a little sometimes diarrhea.  Has not tried any OTC    X2 years - feels chills all over just before going to bed.   X2 years - Fingers and toes feel cold all day.   No change in color or numbness or tingling.   Constant - all the time.       Health Care Directive  Patient does not have a Health Care Directive: Discussed advance care planning with patient; information given to patient to review.      12/22/2024   General Health   How would you rate your overall physical health? Good   Feel stress (tense, anxious, or unable to sleep) Not at all         12/22/2024   Nutrition   Three or more servings of calcium each day? (!) I DON'T KNOW   Diet: Regular (no restrictions)   How many servings of fruit and vegetables per day? (!) 0-1   How many sweetened beverages each day? 0-1         12/22/2024   Exercise   Days per week of moderate/strenous exercise 0 days   Average minutes spent exercising at this level 0 min   (!) EXERCISE CONCERN      12/22/2024   Social Factors   Frequency of gathering with friends or relatives Twice a week   Worry food won't last until get  money to buy more No   Food not last or not have enough money for food? No   Do you have housing? (Housing is defined as stable permanent housing and does not include staying ouside in a car, in a tent, in an abandoned building, in an overnight shelter, or couch-surfing.) No   Are you worried about losing your housing? No   Lack of transportation? No   Unable to get utilities (heat,electricity)? No   Want help with housing or utility concern? No   (!) HOUSING CONCERN PRESENT      12/22/2024   Dental   Dentist two times every year? Yes         12/22/2024   TB Screening   Were you born outside of the [place]? Yes         Today's PHQ-2 Score:       12/22/2024     9:28 AM   PHQ-2 ( 1999 [employer])   Q1: Little interest or pleasure in doing things 1   Q2: Feeling down, depressed or hopeless 0   PHQ-2 Score 1    Q1: Little interest or pleasure in doing things Several days   Q2: Feeling down, depressed or hopeless Not at all   PHQ-2 Score 1       Patient-reported           12/22/2024   Substance Use   Alcohol more than 3/day or more than 7/wk No   Do you use any other substances recreationally? No     Social History     Tobacco Use    Smoking status: Never    Smokeless tobacco: Never   Vaping Use    Vaping status: Never Used   Substance Use Topics    Alcohol use: Never    Drug use: Never         12/22/2024   STI Screening   New sexual partner(s) since last STI/HIV test? No     History of abnormal Pap smear: No - age 21-29 PAP every 3 years recommended        11/17/2023     8:47 AM   PAP / HPV   PAP Negative for Intraepithelial Lesion or Malignancy (NILM)            12/22/2024   Contraception/Family Planning   Questions about contraception or family planning No       Reviewed and updated as needed this visit by Provider   Tobacco  Allergies  Meds  Problems  Med Hx  Surg Hx  Fam Hx     Sexual Activity          Constitutional, HEENT, cardiovascular, pulmonary, GI, , musculoskeletal, neuro, skin, endocrine and psych systems  "are negative, except as otherwise noted in the HPI.      Objective    Exam  /62 (BP Location: Left arm, Patient Position: Chair, Cuff Size: Adult Regular)   Pulse 110   Temp 97.2  F (36.2  C) (Tympanic)   Resp 13   Ht 1.55 m (5' 1.02\")   Wt 49.4 kg (109 lb)   LMP 12/21/2024 (Exact Date)   SpO2 100%   BMI 20.58 kg/m     Estimated body mass index is 20.58 kg/m  as calculated from the following:    Height as of this encounter: 1.55 m (5' 1.02\").    Weight as of this encounter: 49.4 kg (109 lb).    Physical Exam  GENERAL: alert and no distress  EYES: Eyes grossly normal to inspection, PERRL and conjunctivae and sclerae normal  HENT: ear canals and TM's normal, nose and mouth without ulcers or lesions  NECK: no adenopathy, no asymmetry, masses, or scars  RESP: lungs clear to auscultation - no rales, rhonchi or wheezes  CV: regular rate and rhythm, normal S1 S2, no S3 or S4, no murmur, click or rub, no peripheral edema  ABDOMEN: soft, nontender, no hepatosplenomegaly, no masses and bowel sounds normal  MS: no gross musculoskeletal defects noted, no edema  SKIN: no suspicious lesions or rashes  NEURO: Normal strength and tone, mentation intact and speech normal  PSYCH: mentation appears normal, affect normal/bright         Signed Electronically by: CHILANGO Aguila CNP    "

## 2024-12-24 LAB — THYROPEROXIDASE AB SERPL-ACNC: 32 IU/ML

## 2024-12-26 ENCOUNTER — APPOINTMENT (OUTPATIENT)
Dept: INTERPRETER SERVICES | Facility: CLINIC | Age: 24
End: 2024-12-26
Payer: COMMERCIAL

## 2025-01-06 ENCOUNTER — LAB (OUTPATIENT)
Dept: LAB | Facility: CLINIC | Age: 25
End: 2025-01-06
Payer: COMMERCIAL

## 2025-01-06 DIAGNOSIS — Z11.3 SCREEN FOR STD (SEXUALLY TRANSMITTED DISEASE): ICD-10-CM

## 2025-01-06 PROCEDURE — 87591 N.GONORRHOEAE DNA AMP PROB: CPT

## 2025-01-06 PROCEDURE — 87491 CHLMYD TRACH DNA AMP PROBE: CPT

## 2025-01-07 LAB
C TRACH DNA SPEC QL PROBE+SIG AMP: NEGATIVE
N GONORRHOEA DNA SPEC QL NAA+PROBE: NEGATIVE

## 2025-01-08 ENCOUNTER — TELEPHONE (OUTPATIENT)
Dept: FAMILY MEDICINE | Facility: CLINIC | Age: 25
End: 2025-01-08
Payer: COMMERCIAL

## 2025-01-08 ENCOUNTER — APPOINTMENT (OUTPATIENT)
Dept: INTERPRETER SERVICES | Facility: CLINIC | Age: 25
End: 2025-01-08
Payer: COMMERCIAL

## 2025-01-08 NOTE — TELEPHONE ENCOUNTER
Maria C López RN  1/8/2025  9:19 AM CST Back to Top      Attempt # 1     With      Called # Telephone Information:  Mobile          [phone]        Left a non detailed VM     Maria C López RN, BSN  [facility]      Rivka Bradford, APRN CNP  1/8/2025  7:03 AM CST       Please call  [facility] with an  if needed.     Sorry for the delay in getting you your results.        -Normal red blood cell (hgb) levels, normal white blood cell count and normal platelet levels but cells size and volume are low.  Please help her set up a lab only appt non fasting and good hydration to complete a peripheral smear lab that looks at the cells closer.     All other labs normal.     For additional lab test information, [email or web address] is an excellent reference.     In addition, here is a list of due or overdue Health Maintenance reminders:     Flu Vaccine(1) due on 09/01/2024  COVID-19 Vaccine(2 - 2024-25 season) due on 09/01/2024  PHQ-2 (once per calendar year) due on 01/01/2025     Please call us at [phone] (or use [employer]) to address the above recommendations if needed.     Thank you for choosing  [facility].  It was an honor and a privilege to participate in your care.        Healthy regards,     Rivka Bradford, URBANOP  [facility]

## 2025-01-08 NOTE — LETTER
January 15, 2025      Clifford Murray  [address] [place] [address]        Dear ,    We are writing to inform you of your test results.    Sorry for the delay in getting you your results.        -Normal red blood cell (hgb) levels, normal white blood cell count and normal platelet levels but cells size and volume are low.  Please help her set up a lab only appt non fasting and good hydration to complete a peripheral smear lab that looks at the cells closer.     All other labs normal.     For additional lab test information, [email or web address] is an excellent reference.     In addition, here is a list of due or overdue Health Maintenance reminders:     Flu Vaccine(1) due on 09/01/2024  COVID-19 Vaccine(2 - 2024-25 season) due on 09/01/2024  PHQ-2 (once per calendar year) due on 01/01/2025    If you have any questions or concerns, please call the clinic at the number listed above.       Sincerely,      SHANNA Aguila / DEWAYNE,RN      Electronically signed

## 2025-01-08 NOTE — RESULT ENCOUNTER NOTE
Dear Clifford,    Here is a summary of your recent test results:    -Chlamydia and gonnohrea tests are normal.    For additional lab test information, [email or web address] is an excellent reference.    In addition, here is a list of due or overdue Health Maintenance reminders:    Flu Vaccine(1) due on 09/01/2024  COVID-19 Vaccine(2 - 2024-25 season) due on 09/01/2024  PHQ-2 (once per calendar year) due on 01/01/2025    Please call us at [phone] (or use [employer]) to address the above recommendations if needed.    Thank you for choosing [facility].  It was an honor and a privilege to participate in your care.       Healthy regards,    Rivka Bradford, NING  [facility]

## 2025-01-09 NOTE — TELEPHONE ENCOUNTER
Attempt # 2    Called # [phone] via [employer]  ID [id number]    Left a non detailed VM to call back at [phone] and ask for any available Triage Nurse.    CRYSTAL BULLARD RN on 1/9/2025 at 11:09 AM   [facility]

## 2025-01-15 NOTE — TELEPHONE ENCOUNTER
Attempt # 3 encounter closed letter sent        ID [id number]    Called #   Telephone Information:   Mobile [phone]         Left a non detailed VM     Maria C López RN, BSN  [facility]

## 2025-01-15 NOTE — TELEPHONE ENCOUNTER
REFERRAL INFORMATION:  Referring Provider:  Rivka Bradford APRN CNP   Referring Clinic:   FAMILY PRACTICE   Reason for Visit/Diagnosis:   R19.5 (ICD-10-CM) - Dark stools   K59.00 (ICD-10-CM) - Constipation, unspecified constipation type        FUTURE VISIT INFORMATION:  Appointment Date: 2/17/25     NOTES STATUS DETAILS   OFFICE NOTE from Referring Provider Internal 12/23/24   PROCEDURES     STOOL TESTING     LABS     PERTINENT LABS Internal    IMAGES

## 2025-02-17 ENCOUNTER — PRE VISIT (OUTPATIENT)
Dept: GASTROENTEROLOGY | Facility: CLINIC | Age: 25
End: 2025-02-17

## 2025-05-27 LAB
ABO + RH BLD: NORMAL
BLD GP AB SCN SERPL QL: NEGATIVE
SPECIMEN EXP DATE BLD: NORMAL

## 2025-05-28 ENCOUNTER — PRENATAL OFFICE VISIT (OUTPATIENT)
Dept: FAMILY MEDICINE | Facility: CLINIC | Age: 25
End: 2025-05-28
Payer: COMMERCIAL

## 2025-05-28 VITALS
WEIGHT: 110.2 LBS | BODY MASS INDEX: 20.28 KG/M2 | OXYGEN SATURATION: 100 % | TEMPERATURE: 97.1 F | HEART RATE: 115 BPM | SYSTOLIC BLOOD PRESSURE: 100 MMHG | RESPIRATION RATE: 12 BRPM | DIASTOLIC BLOOD PRESSURE: 60 MMHG | HEIGHT: 62 IN

## 2025-05-28 DIAGNOSIS — Z34.01 PRENATAL CARE, FIRST PREGNANCY IN FIRST TRIMESTER: ICD-10-CM

## 2025-05-28 DIAGNOSIS — B96.89 BACTERIAL VAGINOSIS: ICD-10-CM

## 2025-05-28 DIAGNOSIS — Z12.4 SCREENING FOR MALIGNANT NEOPLASM OF CERVIX: ICD-10-CM

## 2025-05-28 DIAGNOSIS — B37.31 YEAST INFECTION OF THE VAGINA: ICD-10-CM

## 2025-05-28 DIAGNOSIS — E04.9 ENLARGED THYROID: ICD-10-CM

## 2025-05-28 DIAGNOSIS — D64.9 ANEMIA, UNSPECIFIED TYPE: ICD-10-CM

## 2025-05-28 DIAGNOSIS — Z34.01 ENCOUNTER FOR SUPERVISION OF NORMAL FIRST PREGNANCY IN FIRST TRIMESTER: ICD-10-CM

## 2025-05-28 DIAGNOSIS — Z32.01 PREGNANCY TEST POSITIVE: Primary | ICD-10-CM

## 2025-05-28 DIAGNOSIS — N76.0 BACTERIAL VAGINOSIS: ICD-10-CM

## 2025-05-28 LAB
CLUE CELLS: PRESENT
ERYTHROCYTE [DISTWIDTH] IN BLOOD BY AUTOMATED COUNT: 16 % (ref 10–15)
HCG UR QL: POSITIVE
HCT VFR BLD AUTO: 33.3 % (ref 35–47)
HGB BLD-MCNC: 11.1 G/DL (ref 11.7–15.7)
MCH RBC QN AUTO: 20.4 PG (ref 26.5–33)
MCHC RBC AUTO-ENTMCNC: 33.3 G/DL (ref 31.5–36.5)
MCV RBC AUTO: 61 FL (ref 78–100)
PLATELET # BLD AUTO: 253 10E3/UL (ref 150–450)
RBC # BLD AUTO: 5.45 10E6/UL (ref 3.8–5.2)
RUBV IGG SERPL QL IA: 4.19 INDEX
RUBV IGG SERPL QL IA: POSITIVE
T PALLIDUM AB SER QL: NONREACTIVE
TRICHOMONAS, WET PREP: ABNORMAL
WBC # BLD AUTO: 8.1 10E3/UL (ref 4–11)
WBC'S/HIGH POWER FIELD, WET PREP: ABNORMAL
YEAST, WET PREP: PRESENT

## 2025-05-28 PROCEDURE — 87389 HIV-1 AG W/HIV-1&-2 AB AG IA: CPT | Performed by: FAMILY MEDICINE

## 2025-05-28 PROCEDURE — 85027 COMPLETE CBC AUTOMATED: CPT | Performed by: FAMILY MEDICINE

## 2025-05-28 PROCEDURE — 87491 CHLMYD TRACH DNA AMP PROBE: CPT | Performed by: FAMILY MEDICINE

## 2025-05-28 PROCEDURE — 86376 MICROSOMAL ANTIBODY EACH: CPT | Performed by: FAMILY MEDICINE

## 2025-05-28 PROCEDURE — G2211 COMPLEX E/M VISIT ADD ON: HCPCS | Performed by: FAMILY MEDICINE

## 2025-05-28 PROCEDURE — 86780 TREPONEMA PALLIDUM: CPT | Performed by: FAMILY MEDICINE

## 2025-05-28 PROCEDURE — 84443 ASSAY THYROID STIM HORMONE: CPT | Performed by: FAMILY MEDICINE

## 2025-05-28 PROCEDURE — 87591 N.GONORRHOEAE DNA AMP PROB: CPT | Performed by: FAMILY MEDICINE

## 2025-05-28 PROCEDURE — 87210 SMEAR WET MOUNT SALINE/INK: CPT | Performed by: FAMILY MEDICINE

## 2025-05-28 PROCEDURE — 99213 OFFICE O/P EST LOW 20 MIN: CPT | Performed by: FAMILY MEDICINE

## 2025-05-28 PROCEDURE — 86901 BLOOD TYPING SEROLOGIC RH(D): CPT | Performed by: FAMILY MEDICINE

## 2025-05-28 PROCEDURE — 87340 HEPATITIS B SURFACE AG IA: CPT | Performed by: FAMILY MEDICINE

## 2025-05-28 PROCEDURE — 82607 VITAMIN B-12: CPT | Performed by: FAMILY MEDICINE

## 2025-05-28 PROCEDURE — 82728 ASSAY OF FERRITIN: CPT | Performed by: FAMILY MEDICINE

## 2025-05-28 PROCEDURE — 87086 URINE CULTURE/COLONY COUNT: CPT | Performed by: FAMILY MEDICINE

## 2025-05-28 PROCEDURE — 83550 IRON BINDING TEST: CPT | Performed by: FAMILY MEDICINE

## 2025-05-28 PROCEDURE — 83655 ASSAY OF LEAD: CPT | Mod: 90 | Performed by: FAMILY MEDICINE

## 2025-05-28 PROCEDURE — 3078F DIAST BP <80 MM HG: CPT | Performed by: FAMILY MEDICINE

## 2025-05-28 PROCEDURE — 36415 COLL VENOUS BLD VENIPUNCTURE: CPT | Performed by: FAMILY MEDICINE

## 2025-05-28 PROCEDURE — 83520 IMMUNOASSAY QUANT NOS NONAB: CPT | Mod: 90 | Performed by: FAMILY MEDICINE

## 2025-05-28 PROCEDURE — G0145 SCR C/V CYTO,THINLAYER,RESCR: HCPCS | Performed by: FAMILY MEDICINE

## 2025-05-28 PROCEDURE — 81025 URINE PREGNANCY TEST: CPT | Performed by: FAMILY MEDICINE

## 2025-05-28 PROCEDURE — 86900 BLOOD TYPING SEROLOGIC ABO: CPT | Performed by: FAMILY MEDICINE

## 2025-05-28 PROCEDURE — 3074F SYST BP LT 130 MM HG: CPT | Performed by: FAMILY MEDICINE

## 2025-05-28 PROCEDURE — 83540 ASSAY OF IRON: CPT | Performed by: FAMILY MEDICINE

## 2025-05-28 PROCEDURE — T1013 SIGN LANG/ORAL INTERPRETER: HCPCS | Performed by: FAMILY MEDICINE

## 2025-05-28 PROCEDURE — 87088 URINE BACTERIA CULTURE: CPT | Performed by: FAMILY MEDICINE

## 2025-05-28 PROCEDURE — 86762 RUBELLA ANTIBODY: CPT | Performed by: FAMILY MEDICINE

## 2025-05-28 PROCEDURE — 99000 SPECIMEN HANDLING OFFICE-LAB: CPT | Performed by: FAMILY MEDICINE

## 2025-05-28 PROCEDURE — 86850 RBC ANTIBODY SCREEN: CPT | Performed by: FAMILY MEDICINE

## 2025-05-28 PROCEDURE — 99459 PELVIC EXAMINATION: CPT | Performed by: FAMILY MEDICINE

## 2025-05-28 RX ORDER — CLOTRIMAZOLE 1 %
1 CREAM WITH APPLICATOR VAGINAL AT BEDTIME
Qty: 45 G | Refills: 0 | Status: SHIPPED | OUTPATIENT
Start: 2025-05-28 | End: 2025-06-04

## 2025-05-28 RX ORDER — CLINDAMYCIN PHOSPHATE 20 MG/G
1 CREAM VAGINAL AT BEDTIME
Qty: 40 G | Refills: 0 | Status: SHIPPED | OUTPATIENT
Start: 2025-05-28 | End: 2025-06-04

## 2025-05-28 RX ORDER — FERROUS GLUCONATE 324(38)MG
324 TABLET ORAL
Qty: 100 TABLET | Refills: 3 | Status: SHIPPED | OUTPATIENT
Start: 2025-05-28

## 2025-05-28 RX ORDER — VITAMIN A ACETATE, .BETA.-CAROTENE, ASCORBIC ACID, CHOLECALCIFEROL, .ALPHA.-TOCOPHEROL ACETATE, DL-, THIAMINE MONONITRATE, RIBOFLAVIN, NIACINAMIDE, PYRIDOXINE HYDROCHLORIDE, FOLIC ACID, CYANOCOBALAMIN, CALCIUM CARBONATE, FERROUS FUMARATE, ZINC OXIDE, CUPRIC OXIDE 9.9; 120; 920; 200; 400; 2; 12; 27; 1; 20; 10; 3; 1.84; 3080; 25 MG/1; MG/1; [IU]/1; MG/1; [IU]/1; MG/1; UG/1; MG/1; MG/1; MG/1; MG/1; MG/1; MG/1; [IU]/1; MG/1
1 TABLET, FILM COATED ORAL DAILY
Qty: 100 TABLET | Refills: 3 | Status: SHIPPED | OUTPATIENT
Start: 2025-05-28

## 2025-05-28 NOTE — PROGRESS NOTES
SUBJECTIVE:   Clifford Murray is a 25 year old woman,   who presents for an initial prenatal visit at Unknown gestation. Patient's last menstrual period was 04/10/2025 (approximate).      Pt's primary language is Camb[place]ian. She speaks English fairly well, but  iPad Video  utilized for visit today.- Lei. . She has family support here in the [place] - parents and older brother. FOB is involved - they are together as a couple.      DAGO: 1/15/2025  EGA 6w6d today     She has not had bleeding since her LMP. She has had mild nausea in the mornings or when she is hungry. Weight loss has not occurred.   Periods prior to pregnancy were very regular. Sometimes early - about 1 week early at times.  Last period was 1 week early. Hasn't been taking any prenatal vitamins at all.      OTHER CONCERNS: fatigue.     OB History    Para Term  AB Living   1 0 0 0 0 0   SAB IAB Ectopic Multiple Live Births   0 0 0 0 0      # Outcome Date GA Lbr Alexey/2nd Weight Sex Type Anes PTL Lv   1 Current                There is no problem list on file for this patient.      Past Medical History:   Diagnosis Date    Known health problems: none      Past Surgical History:   Procedure Laterality Date    NO HISTORY OF SURGERY       No current outpatient medications on file.      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 No   Social History Narrative    Pt's primary language is Cambodian. She speaks English fairly well, but  iPad Video  utilized for visit today.- Lei. . She has family support [place] in [place] - parents and older brother. FOB is involved - they are together as a couple.  ---Nelsy Acosta MD  noted May 28, 2025           Social Drivers of Health     Financial Resource Strain: Low Risk  (12/22/2024)    Financial Resource Strain     Within the past 12 months, have you or your family members you live with been unable to get utilities (heat, electricity) when it was really needed?: No   Food Insecurity: Low Risk  (12/22/2024)    Food Insecurity     Within the past 12 months, did you worry that your food would run out before you got money to buy more?: No     Within the past 12 months, did the food you bought just not last and you didn t have money to get more?: No   Transportation Needs: Low Risk  (12/22/2024)    Transportation Needs     Within the past 12 months, has lack of transportation kept you from medical appointments, getting your medicines, non-medical meetings or appointments, work, or from getting things that you need?: No   Physical Activity: Inactive (12/22/2024)    Exercise Vital Sign     Days of Exercise per Week: 0 days     Minutes of Exercise per Session: 0 min   Stress: No Stress Concern Present (12/22/2024)    [employer] [place] [employer] - Occupational Stress Questionnaire     Feeling of Stress : Not at all   Social Connections: Unknown (12/22/2024)    Social Connection and Isolation Panel [NHANES]     Frequency of Social Gatherings with Friends and Family: Twice a week   Interpersonal Safety: Low Risk  (12/23/2024)    Interpersonal Safety     Do you feel physically and emotionally safe where you  currently live?: Yes     Within the past 12 months, have you been hit, slapped, kicked or otherwise physically hurt by someone?: No     Within the past 12 months, have you been humiliated or emotionally abused in other ways by your partner or ex-partner?: No   Housing Stability: High Risk (12/22/2024)    Housing Stability     Do you have housing? : No     Are you worried about losing your housing?: No       No Known Allergies    Family History   Problem Relation Age of Onset    Diabetes Mother     Hypertension Mother     Hyperlipidemia Mother     Cerebrovascular Disease Mother 59    Cirrhosis Father         ?    Other Problems  (hepatitis C) Father         treated in [place]     Prenatal Genetic Screening/Infection History - see OB questionnaire - reviewed    - Genetic/Infection questionnaire completed, risks include muscular dystrophy, anemia, possible thallasemia , . Pt  does not have a recent known exposure to Parvo or CMV so IgG/IgM testing WILL NOT be ordered.   Have you traveled during the pregnancy?No  Have your sexual partner(s) travelled during the pregnancy?No      - Risk for Gestational Diabetes -  does not  have Personal history of GDM, BMI>30, h/o prediabetes/glucose intolerance, first degree relative with GDM or DM  -   WILL NOT have an early GCT and possible Hgb A1C    -Preeclampsia Risk     - High Risk for Preeclampsia- meets one of following criteria The patient does not h/o Pre Eclampsia, Current multi fetal gestation, Pre Gestational Diabetes (Type 1 or Type 2), chronic hypertension, renal disease, Autoimmune disease (systematic lupus erythematosus, antiphospholipid syndrome) so WILL NOT start low dose aspirin (81mg) starting between 12 and 28 weeks to prevent early onset preeclampsia.    - Moderate risk - meets more than one of several moderate risk facrtors for Preeclampsia- Nulliparity, Obesity (body mass index >30 kg/m2),Family history of preeclampsia (mother or sister), Sociodemographic  characteristics ( race, low socioeconomic status), Age >=35 years, Personal history factors (e.g., low birthweight or small for gestational age, previous adverse pregnancy outcome, >10-year pregnancy interval)  so WILL NOT consider starting low dose aspirin (81mg) starting between 12 and 28 weeks to prevent early onset preeclampsia.    - The patient  does not have a history of spontaneous  birth so  WILL NOT consider progesterone starting at 16-20 weeks and/or serial transvaginal cervical length ultrasounds from 16-24 weeks.     -The patient does not have a history of immunosuppresion or HIV so Toxoplasma IgG/IgM WILL NOT be ordered.    Pre Term Labor Risk Assessment   no -Is the patient's age <18 or >40?  no -Does the patient have a BMI < 18.5?   no -If previous pregnancy, was delivery within previous 6 months?   no -Have you ever been diagnosed with pyelonephritis?   no -Have you ever delivered a baby prior to 37 weeks gestation?   no -Have you ever been told you have a uterine anomaly?   no -Do you currently have uterine fibroids?   no -Have you had any gynecological surgical procedures such as cervical conization, a LEEP procedure, laser treatment or cryosurgery of the cervix?   no -Did your mother take RICK or any other hormones when she was pregnant with you?   no -Did conception for this pregnancy occur via In Vitro Fertilization?   no -Are you carrying twins? - don't know yet - awaiting ultrasound   no -Do you currently use tobacco products?   no -Prior to this pregnancy, how much alcohol did you drink each week? (if >7/week= high risk..)   no -Since you learned you were pregnant, how much beer, wine or hard liquor did you drink per week? (anything >0 = high risk)   no -Prior to learning you are pregnant, did you use any of the following: marijuana, prescription narcotics, speed, cocaine, heroin, hallucinogens or other drugs? (any use within 1 yr = high risk)  no -Since you learned you  "are pregnant, have you used any of the following: marijuana, prescription narcotics, speed, cocaine, heroin, hallucinogens or other drugs? (any use = high risk)  no -Do you have a history of chemical dependency (yes=high risk)   no -Have you ever been treated for more than 1 Urinary Tract Infection during this pregnancy?   no -Do you have a history of Depression, Bi-polar disorder, anxiety, schizophrenia or other mental illness?   no -Are you currently being treated for depression, bi-polar disorder, anxiety, schizophrenia or other mental illness?   no -Have you had Chlamydia or gonorrhea during this pregnancy?   no -Periodontal disease (gum disease)?  no -Has anyone hit, slapped, kicked or otherwise hurt you?   no -Has anyone forced you to have sex when you didn't want to?       Summary:  Patient is not high risk for  Labor     Reviewed and updated as needed this visit by clinical staff   Tobacco  Allergies  Meds  Problems  Med Hx  Surg Hx  Fam Hx        Reviewed and updated as needed this visit by Provider   Tobacco  Allergies  Meds  Problems  Med Hx  Surg Hx  Fam Hx     Sexual Activity         REVIEW OF SYSTEMS  Constitutional, HEENT, cardiovascular, pulmonary, GI, , musculoskeletal, neuro, skin, endocrine and psych systems are negative, except as otherwise noted.  OBJECTIVE:       /60   Pulse 115   Temp 97.1  F (36.2  C) (Tympanic)   Resp 12   Ht 1.575 m (5' 2\")   Wt 50 kg (110 lb 3.2 oz)   LMP 04/10/2025 (Approximate)        GENERAL:  Pleasant pregnant female, alert, well groomed.  SKIN:  Warm and dry, without lesions or rashes  HEAD: Symmetrical features.  EYES:  PERRLA,   MOUTH:  Buccal mucosa pink, moist without lesions.    NECK:  Thyroid without enlargement and nodules.  Lymph nodes not palpable. ***  LUNGS:  Clear to auscultation.  BREAST:  Symmetrical.  No dominant, fixed or suspicious masses are noted.  No skin or nipple changes or axillary nodes.  Self exam is taught " "and encouraged.  Nipples {NIPPLES:537593::\"everted\"}.      HEART:  RRR without murmur.  ABDOMEN: Soft without masses , tenderness or organomegaly.  No CVA tenderness. {ABD OB:937706}. FHT ***  MUSCULOSKELETAL:  Full range of motion  EXTREMITIES:  No edema. No significant varicosities.   GENITALIA:  BUS WNL, no lesions noted ***  VAGINA:  Pink, normal rugae and discharge {GYN VAGINAL DISCHARGE:721}, ***  CERVIX:  smooth, without discharge or CMT and {CERVIC2:624029} os,   {CERVIX 3:713160::\"firm/ closed\"} {NUMBERS1-5:949049::\"2\"} cm long.  UTERUS: {UTERINE POSITION:804524::\"Anteverted\"}, nontender {4-11:640532} weeks in size.  ADNEXA: {ADNEXA OB:727208::\"Without masses or tenderness\"}  PELVIS:   {km bony pelvis:604803::\"Adequate\"}, Pelvis proven to {BIRTH WEIGHT:616112}    See OB Flowsheet as well.    CBC RESULTS:   Recent Labs   Lab Test 05/28/25  1032   WBC 8.1   RBC 5.45*   HGB 11.1*   HCT 33.3*   MCV 61*   MCH 20.4*   MCHC 33.3   RDW 16.0*            ASSESSMENT/PLAN:         Clifford was seen today for prenatal care.    Diagnoses and all orders for this visit:    Pregnancy test positive  -     Urine Culture Aerobic Bacterial; Future  -     HCG Qual, Urine ([id number]); Future  -     Urine Culture Aerobic Bacterial  -     HCG Qual, Urine ([id number])    Prenatal care, first pregnancy in first trimester  -     ABO/Rh type and screen; Future  -     Hepatitis B surface antigen; Future  -     CBC with platelets; Future  -     HIV Antigen Antibody Combo; Future  -     Rubella Antibody IgG; Future  -     Treponema Abs w Reflex to RPR and Titer; Future  -     Chlamydia Trachomatis PCR CERVICAL  -     Neisseria gonorrhoeae PCR  CERVICAL  -     Lead, Venous Blood; Future  -     Wet prep  -     ABO/Rh type and screen  -     Hepatitis B surface antigen  -     CBC with platelets  -     HIV Antigen Antibody Combo  -     Rubella Antibody IgG  -     Treponema Abs w Reflex to RPR and Titer  -     Lead, Venous Blood    Encounter for " supervision of normal first pregnancy in first trimester  -      OB < 14 Weeks Single; Future    Anemia, unspecified type          RECOMMENDED WEIGHT GAIN: {WGT GAIN:132654}  Instructed on best evidence for: weight gain for her BMI for pregnancy; healthy diet and foods to avoid; exercise and activity during pregnancy;avoiding exposure to toxoplasmosis; and maintenance of a generally healthy lifestyle.   Discussed the harms, benefits, side effects and alternative therapies for current prescribed and OTC medications.    Discussed genetic screening- ***    Followup in 4 weeks or sooner , if needed.            Nelsy Acosta MD

## 2025-05-28 NOTE — PATIENT INSTRUCTIONS
Healthy Eating and Pregnancy  Ten Tips for Good Nutrition    The food you eat helps your baby grow.  Good nutrition can help prevent birth defects and help your baby have a healthy birthweight.  Each day, eat a variety of healthy foods.  Vegetables (3 times a day)  Fruits (2 times a day  Grains such as bread, cereal, pasta or tortillas (6 times a day)  Milk, cheese or yogurt 3-4 times a day  Meat or other protein foods such as beans, peanut butter or tofu (3 times a day)  A serving may be   cup of vegetables, one piece of fruit, one slice of bread or an 8 ounce glass of milk.  Get plenty of calcium, iron and zinc  For calcium, eat 3-4 servings of milk, cheese, yogurt, firm tofu or dark green leafy vegetables each day.  For iron and zinc, eat red meats, beans and whole grains.  Eat foods high in folic acid  Getting enough folic acid can help prevent certain types of birth defects. It s important to get enough folic acid before you become pregnant and in early pregnancy  Ask your health care provider if you should take a prenatal vitamin  It is one way to make sure you are getting enough folic acid, calcium and other important vitamins and minerals  Drink at least 6-8 glasses of water each day.  You may need to drink even more in hot weather.  Avoid alcohol and limit caffeine  Beer, wine, and other kinds of alcohol can hurt your baby. It s best not to drink it at all.  Too much caffeine  (in coffee, soda, tea and chocolate) may harm your baby.  Limit yourself to 1 food or drink that contains caffeine each day.  Make sure your food is fully cooked.  Food poisoning from raw or undercooked meat, chicken, turkey, fish or eggs can be very dangerous to your baby.  Eat 4-6 small meals a day  This can help with morning sickness and indigestion  Most women need to eat about 300 extra calories each day  This is like having an extra glass of low-fat milk and a turkey sandwich  Most women need to gain 25 to 35 pounds during  pregnancy  Ask your health care provider how much weight you should gain     Exercise and Pregnancy  Ten Tips for Staying Fit  Exercising while pregnant can help you feel better and give you more strength for labor. You may recover more quickly after delivery. Your baby may even be healthier.  Talk with your health care provider before starting.  Most women can exercise safely during pregnancy.  But because each pregnancy is different talk to your health care provider to make sure exercising is right for you and your baby.  Exercise at least 3 times a week  You will be less likely to overdo or hurt yourself than if you only exercise once in a while  Try walking or swimming  Or join a low-impact aerobics class especially for pregnant women  Warm up slowly  Because most women s joints are looser during pregnancy, you will need to be careful not to stretch too much.  You may also want to avoid sit-ups and crunches  Avoid activities that may be risky for your baby  Do not do activities where you may fall or jar your body  For your baby s safety. Do not exercise on your back after the first three months  Don t let your heart rate go over 140 beats per minute  Ask you health care provider how to check your heart rate when exercising  Wear cool clothing and don t exercise in hot, humid weather.  Getting too hot can hurt your baby  If you lose your breath or get tired, slow down  You may not be able to do as much as you used to  Eat well and drinks lots of water  Drink at least 1 glass of water before exercising and 1 glass after exercising  If you are having a hard time gaining weight, talk to your health care provider  Stop and call your health care provider if you have any problems  Including:  Feeling very tired, dizzy or faint  Pain in your abdomen  Vaginal bleeding  Chest pains or trouble catching your breath  Contractions that continue for more than 30 minutes after exercising                                                  SAFE MEDICATIONS IN PREGNANCY    MEDICATION                                                                   INDICATION                                                Tylenol                                                                     fever, aches and pain  Tylenol Sinus                                                           fever, aches and pain, nasal congestion  Sudafed                                                                    nasal congestion  Actifed                                                                      nasal congestion  Robitussin                                                                cough  Robitussin-DM                                                        cough  Throat Lozenges                                                     dry scratchy or sore throat    Mylanta                                                                    heartburn  Maalox                                                                     heartburn  Tums                                                                         heartburn  Rolaids                                                                      heartburn    Metamucil or wafer                                               stool softener  Milk of  Magnesia                                                   stool softener, laxative-only occasional use  Dialose Plus                                                             stool softener, laxative-only occasional use  Colace                                                                       stool softener, laxative-only occasional use    Preparation H                                                          hemorrhoids  Anusol HC or plain                                                  hemorrhoids    Kopectate                                                                 diarrhea    Monistat                                                                    yeast infection    RID shampoo                                                            lice    Novacaine                                                                 dental work or minor surgery    Allergy Shots                                                             OK for allergies    Mantoux                                                                    TB test    ANTIBIOTICS:   Penicillin (and all Penicillin family), Ampicillin, Amoxicillin,                             Augmentin, Pen V-K, Keflex, Erythromycin, Macrodantin and                             Macrobid                                                                 Feeling Queasy?  10 ways to cope with morning sickness  Morning sickness,  the bane of pregnant women everywhere, can knock even the most active g-getter off her feet.  It can be particularly troublesome when you have to work every morning and your still trying to keep your pregnancy quiet at the office.  No doubt, you are already avoiding any foods and smells that make your stomach churn.  Here are 10 more ways to keep nausea at bay (if you suffer from extreme nausea, talk to your healthcare provider about other treatments, including medicine.  Keep simple snacks, such as crackers, by your bedside.  When you first wake up, nibble a few crackers and then rest for 20 or 30 minutes before getting out of bed.   Eat small, frequent meals or snacks throughout the day. Aim for bland foods that are high in protein or carbohydrates, as both can help fight nausea.  Good choices include crackers, animal crackers and yogurt.  Try taking 50mg of vitamin B6 twice a day with your healthcare providers consent. B6 helps the body metabolize certain amino acids (proteins), which may somehow reduce nausea, although no one knows exactly why this works. Or try eating foods like yogurt which are high in B vitamins  Avoid rich, spicy, acidic and fried foods and eat less fat in general..  Though it is  important to keep yourself well hydrated, try drinking fluids only between meals and limit them during meals.  Sniff seun.  The smell of a cut lemon may help your nausea. Put slices in your iced tea or sparkling water.  Drink ginger ale or ginger tea. Ginger is known to settle your stomach and help queasiness . But make sure the beverage you choose is made with real ginger-many sodas are not. (ask your doctor before taking ginger supplements since some experts think they may increase the risk of miscarriage)  Give yourself time to relax. Talking things over with another mother-to-be can be a nice way to relive stress and emotions may also play a role in morning sickness.  Try acupressure bands.  You can find these soft cotton wristbands at [facility].  This simple device, design to quick off seasickness, has also helped many pregnant woman through morning sickness.  Strap it on so that the plastic button pushes against an acupressure point in the underside of your wrist, and that you may begin feeling some relief.  Take prenatal vitamins at night instead of morning. Decrease iron to once a day instead of twice a day   Timing of prenatal visits:   Every 4 weeks from now until 30 weeks,   Then every 2 weeks until 36 weeks, then every week until we have a baby - up to 41 weeks gestational age.        exposure to the virus,  which is now widespread in the state.  These hours may change with very little notice.  We apologize for any inconvenience.       Our current clinic hours are:          Monday- Thursday   7:00am - 6:00pm  in person.      Friday  7:00am- 5:00pm                       Saturday and Sunday : Closed to in person and virtual visits        We have telephone and virtual visit times available between    7:00am - 6pm on Monday-Friday as well.                                                Phone:  [phone]      Our pharmacy hours: Monday through Friday 8:00am to 5:00pm                        Saturday - 9:00 am to 12 noon       Sunday : Closed.              Phone:  [phone]              ###  Please note: at this time we are not accepting any walk-in visits. ###      There is also information available at our web site:  [email or web address]    If your provider ordered any lab tests and you do not receive the results within 10 business days, please call the clinic.    If you need a medication refill please contact your pharmacy.  Please allow 3 business days for your refill to be completed.    Our clinic offers telephone visits and e visits.  Please ask one of your team members to explain more.      Use [email or web address] (secure email communication and access to your chart) to send your primary care provider a message or make an appointment. Ask someone on your Team how to sign up for [employer].

## 2025-05-29 ENCOUNTER — APPOINTMENT (OUTPATIENT)
Dept: INTERPRETER SERVICES | Facility: CLINIC | Age: 25
End: 2025-05-29
Payer: COMMERCIAL

## 2025-05-29 LAB
BACTERIA UR CULT: ABNORMAL
BACTERIA UR CULT: ABNORMAL
C TRACH DNA SPEC QL NAA+PROBE: NEGATIVE
FERRITIN SERPL-MCNC: 97 NG/ML (ref 6–175)
HBV SURFACE AG SERPL QL IA: NONREACTIVE
HIV 1+2 AB+HIV1 P24 AG SERPL QL IA: NONREACTIVE
IRON BINDING CAPACITY (ROCHE): 256 UG/DL (ref 240–430)
IRON SATN MFR SERPL: 49 % (ref 15–46)
IRON SERPL-MCNC: 125 UG/DL (ref 37–145)
LEAD BLDV-MCNC: <2 UG/DL
N GONORRHOEA DNA SPEC QL NAA+PROBE: NEGATIVE
SPECIMEN TYPE: NORMAL
SPECIMEN TYPE: NORMAL
TSH SERPL DL<=0.005 MIU/L-ACNC: 1.68 UIU/ML (ref 0.3–4.2)
VIT B12 SERPL-MCNC: 576 PG/ML (ref 232–1245)

## 2025-05-30 LAB
THYROPEROXIDASE AB SERPL-ACNC: 27 IU/ML
TSH RECEP AB SER-ACNC: <1.1 IU/L (ref 0–1.75)

## 2025-06-02 LAB
BKR LAB AP GYN ADEQUACY: NORMAL
BKR LAB AP GYN INTERPRETATION: NORMAL
BKR LAB AP HPV REFLEX: NORMAL
BKR LAB AP LMP: NORMAL
BKR LAB AP PREVIOUS ABNORMAL: NORMAL
PATH REPORT.COMMENTS IMP SPEC: NORMAL
PATH REPORT.COMMENTS IMP SPEC: NORMAL
PATH REPORT.RELEVANT HX SPEC: NORMAL

## 2025-06-03 ENCOUNTER — RESULTS FOLLOW-UP (OUTPATIENT)
Dept: OBGYN | Facility: CLINIC | Age: 25
End: 2025-06-03

## 2025-06-03 DIAGNOSIS — N76.0 BV (BACTERIAL VAGINOSIS): Primary | ICD-10-CM

## 2025-06-03 DIAGNOSIS — B37.31 YEAST INFECTION OF THE VAGINA: ICD-10-CM

## 2025-06-03 DIAGNOSIS — B96.89 BV (BACTERIAL VAGINOSIS): Primary | ICD-10-CM

## 2025-06-23 RX ORDER — CLINDAMYCIN PHOSPHATE 20 MG/G
1 CREAM VAGINAL AT BEDTIME
Qty: 40 G | Refills: 0 | Status: SHIPPED | OUTPATIENT
Start: 2025-06-23 | End: 2025-06-28

## 2025-06-23 RX ORDER — CLOTRIMAZOLE 1 %
1 CREAM WITH APPLICATOR VAGINAL AT BEDTIME
Qty: 45 G | Refills: 0 | Status: SHIPPED | OUTPATIENT
Start: 2025-06-23 | End: 2025-06-30

## 2025-07-09 ENCOUNTER — TELEPHONE (OUTPATIENT)
Dept: FAMILY MEDICINE | Facility: CLINIC | Age: 25
End: 2025-07-09

## 2025-07-09 NOTE — TELEPHONE ENCOUNTER
Placed call to patient to help reschedule OB appt 7/9 to 7/31.    Pt prefers to stick with Dr. Acosta for OB care.     -Wondering if it's okay to wait this long to be seen? No sooner availabilities with Dr. Acosta, recommend to schedule with Dr. Jones for sooner availabilities?    Please advise.     Please call patient with provider's response

## 2025-07-09 NOTE — TELEPHONE ENCOUNTER
Patient calling back.    Pt did schedule OB visit with Dr. Jones 7/17 - would like to still keep 7/31 appt with Dr. Acosta.

## 2025-07-17 ENCOUNTER — PRENATAL OFFICE VISIT (OUTPATIENT)
Dept: FAMILY MEDICINE | Facility: CLINIC | Age: 25
End: 2025-07-17
Payer: COMMERCIAL

## 2025-07-17 ENCOUNTER — TRANSCRIBE ORDERS (OUTPATIENT)
Dept: MATERNAL FETAL MEDICINE | Facility: CLINIC | Age: 25
End: 2025-07-17

## 2025-07-17 VITALS
RESPIRATION RATE: 16 BRPM | HEART RATE: 116 BPM | DIASTOLIC BLOOD PRESSURE: 60 MMHG | TEMPERATURE: 98.1 F | BODY MASS INDEX: 21.16 KG/M2 | WEIGHT: 115 LBS | HEIGHT: 62 IN | OXYGEN SATURATION: 100 % | SYSTOLIC BLOOD PRESSURE: 102 MMHG

## 2025-07-17 DIAGNOSIS — D64.9 ANEMIA, UNSPECIFIED TYPE: ICD-10-CM

## 2025-07-17 DIAGNOSIS — Z34.02 ENCOUNTER FOR SUPERVISION OF NORMAL FIRST PREGNANCY IN SECOND TRIMESTER: Primary | ICD-10-CM

## 2025-07-17 DIAGNOSIS — R11.0 NAUSEA: ICD-10-CM

## 2025-07-17 DIAGNOSIS — O26.90 PREGNANCY RELATED CONDITION, ANTEPARTUM: Primary | ICD-10-CM

## 2025-07-17 LAB
ALBUMIN UR-MCNC: NEGATIVE MG/DL
GLUCOSE UR STRIP-MCNC: NEGATIVE MG/DL

## 2025-07-17 RX ORDER — ONDANSETRON 4 MG/1
4 TABLET, ORALLY DISINTEGRATING ORAL EVERY 8 HOURS PRN
Qty: 20 TABLET | Refills: 1 | Status: SHIPPED | OUTPATIENT
Start: 2025-07-17

## 2025-08-04 ENCOUNTER — PRE VISIT (OUTPATIENT)
Dept: MATERNAL FETAL MEDICINE | Facility: CLINIC | Age: 25
End: 2025-08-04
Payer: COMMERCIAL

## 2025-08-11 ENCOUNTER — HOSPITAL ENCOUNTER (OUTPATIENT)
Dept: ULTRASOUND IMAGING | Facility: CLINIC | Age: 25
Discharge: HOME | End: 2025-08-11
Attending: OBSTETRICS & GYNECOLOGY
Payer: COMMERCIAL

## 2025-08-11 ENCOUNTER — LAB (OUTPATIENT)
Dept: LAB | Facility: CLINIC | Age: 25
End: 2025-08-11
Attending: OBSTETRICS & GYNECOLOGY
Payer: COMMERCIAL

## 2025-08-11 ENCOUNTER — OFFICE VISIT (OUTPATIENT)
Dept: MATERNAL FETAL MEDICINE | Facility: CLINIC | Age: 25
End: 2025-08-11
Attending: OBSTETRICS & GYNECOLOGY
Payer: COMMERCIAL

## 2025-08-11 DIAGNOSIS — Z31.430 ENCOUNTER OF FEMALE FOR TESTING FOR GENETIC DISEASE CARRIER STATUS FOR PROCREATIVE MANAGEMENT: ICD-10-CM

## 2025-08-11 DIAGNOSIS — O26.90 PREGNANCY RELATED CONDITION, ANTEPARTUM: ICD-10-CM

## 2025-08-11 DIAGNOSIS — Z36.3 ENCOUNTER FOR ROUTINE SCREENING FOR FETAL MALFORMATION USING ULTRASOUND: ICD-10-CM

## 2025-08-11 DIAGNOSIS — Z36.0 ENCOUNTER FOR ANTENATAL SCREENING FOR CHROMOSOMAL ANOMALIES: Primary | ICD-10-CM

## 2025-08-11 DIAGNOSIS — O44.42 LOW-LYING PLACENTA IN SECOND TRIMESTER: Primary | ICD-10-CM

## 2025-08-11 DIAGNOSIS — Z36.0 ENCOUNTER FOR ANTENATAL SCREENING FOR CHROMOSOMAL ANOMALIES: ICD-10-CM

## 2025-08-11 PROCEDURE — 96041 GENETIC COUNSELING SVC EA 30: CPT

## 2025-08-11 PROCEDURE — 36415 COLL VENOUS BLD VENIPUNCTURE: CPT

## 2025-08-11 PROCEDURE — 999N000069 HC STATISTIC GENETIC COUNSELING, < 16 MIN

## 2025-08-11 PROCEDURE — 76811 OB US DETAILED SNGL FETUS: CPT

## 2025-08-13 ENCOUNTER — PRENATAL OFFICE VISIT (OUTPATIENT)
Dept: FAMILY MEDICINE | Facility: CLINIC | Age: 25
End: 2025-08-13
Payer: COMMERCIAL

## 2025-08-13 VITALS
RESPIRATION RATE: 16 BRPM | TEMPERATURE: 97.4 F | OXYGEN SATURATION: 100 % | BODY MASS INDEX: 21.78 KG/M2 | HEART RATE: 96 BPM | SYSTOLIC BLOOD PRESSURE: 112 MMHG | DIASTOLIC BLOOD PRESSURE: 62 MMHG | WEIGHT: 119.1 LBS

## 2025-08-13 DIAGNOSIS — D64.9 ANEMIA, UNSPECIFIED TYPE: ICD-10-CM

## 2025-08-13 DIAGNOSIS — Z34.02 ENCOUNTER FOR SUPERVISION OF NORMAL FIRST PREGNANCY IN SECOND TRIMESTER: Primary | ICD-10-CM

## 2025-08-13 DIAGNOSIS — O44.42 LOW LYING PLACENTA NOS OR WITHOUT HEMORRHAGE, SECOND TRIMESTER: ICD-10-CM

## 2025-08-13 LAB — FOLATE SERPL-MCNC: 25.6 NG/ML (ref 4.6–34.8)

## 2025-08-13 PROCEDURE — 82746 ASSAY OF FOLIC ACID SERUM: CPT | Performed by: FAMILY MEDICINE

## 2025-08-13 PROCEDURE — 36415 COLL VENOUS BLD VENIPUNCTURE: CPT | Performed by: FAMILY MEDICINE

## 2025-08-18 ENCOUNTER — APPOINTMENT (OUTPATIENT)
Dept: INTERPRETER SERVICES | Facility: CLINIC | Age: 25
End: 2025-08-18
Payer: COMMERCIAL

## 2025-08-18 ENCOUNTER — TELEPHONE (OUTPATIENT)
Dept: MATERNAL FETAL MEDICINE | Facility: CLINIC | Age: 25
End: 2025-08-18
Payer: COMMERCIAL

## 2025-08-18 LAB — SCANNED LAB RESULT: NORMAL

## 2025-08-20 ENCOUNTER — APPOINTMENT (OUTPATIENT)
Dept: INTERPRETER SERVICES | Facility: CLINIC | Age: 25
End: 2025-08-20
Payer: COMMERCIAL

## 2025-08-20 ENCOUNTER — TELEPHONE (OUTPATIENT)
Dept: MATERNAL FETAL MEDICINE | Facility: CLINIC | Age: 25
End: 2025-08-20
Payer: COMMERCIAL

## 2025-08-25 LAB — SCANNED LAB RESULT: ABNORMAL
